# Patient Record
Sex: FEMALE | Race: WHITE
[De-identification: names, ages, dates, MRNs, and addresses within clinical notes are randomized per-mention and may not be internally consistent; named-entity substitution may affect disease eponyms.]

---

## 2018-02-19 NOTE — EDPHY
H & P


Stated Complaint: Dehydrated, fatigued past "week",





- Personal History


LMP (Females 10-55): Over 28 Days Ago


Current Tetanus Diphtheria and Acellular Pertussis (TDAP): Unsure





- Medical/Surgical History


Hx Asthma: No


Hx Chronic Respiratory Disease: No


Hx Diabetes: No


Hx Cardiac Disease: No


Hx Renal Disease: No


Hx Cirrhosis: No


Hx Alcoholism: No


Hx HIV/AIDS: No


Hx Splenectomy or Spleen Trauma: No


Other PMH: lyme disease,





- Social History


Smoking Status: Never smoked


Time Seen by Provider: 02/19/18 02:46


HPI/ROS: 





Chief Complaint:  Dehydrated, fatigue





HPI:  37-year-old woman states that she has been feeling dehydrated and having 

increasing thirst and increasing urination for the past several days.  Patient 

has been diagnosed with chronic Lyme disease.  Patient also states she has been 

diagnosed with heavy metal poisoning and mold exposure in the past.  She has 

been struggling with symptoms for the past 3 years.  She has not seen a primary 

care doctor in about a month and a half.  For the last week or so she has noted 

that she has had increasing urination.  Being coming concerned that she might 

be coming dehydrated because of increasing urination she has been drinking more 

fluids which in turn has caused her to have increased urination.  She does not 

feel particularly thirsty but is concerned about getting dehydrated.  No nausea 

or vomiting.  No abdominal pain.  No fevers or chills.  No recent weight gain 

or weight loss.  She has had increasing sleeping recently.  She denies 

depression or thoughts of suicide.  She states that her workup for her Lyme 

disease was extensive and long.  She was never tested positive for any line 

markers but was diagnosed based on her clinical symptoms by her physician.  She 

states that she has been taking multiple herbal and vitamin supplements to 

treat her symptoms.  No heat or cold intolerance.  No weight loss or weight 

gain recently.





ROS:  10 point Review of Systems is negative except as noted in the HPI.





PMH:  Chronic Lyme disease, heavy metal poisoning,





Social History: No smoking, no alcohol,  no recreational drug use





Family History: non-contributory





Physical Exam:


Gen: Awake, Alert, No Distress


HEENT:  


     Nose: no rhinorrhea


     Eyes: PERRLA, EOMI


     Mouth: Moist mucosa 


Neck: Supple, no JVD


Chest: nontender, lungs clear to auscultation


Heart: S1, S2 normal, no murmur


Abd: Soft, non-tender, no guarding


Back: no CVA tenderness, no midline tenderness 


Ext: no edema, non-tender


Skin: no rash


Neuro: CN II-XII intact, Sensation grossly intact, Strength 5/5 in bilateral 

upper and lower extremities


 (Bobby Rincon)


Constitutional: 


 Initial Vital Signs











Temperature (C)  36.9 C   02/19/18 02:29


 


Heart Rate  86   02/19/18 02:29


 


Respiratory Rate  20   02/19/18 02:29


 


Blood Pressure  122/79 H  02/19/18 02:29


 


O2 Sat (%)  98   02/19/18 02:29








 











O2 Delivery Mode               Room Air














Allergies/Adverse Reactions: 


 





No Known Allergies Allergy (Unverified 02/19/18 02:29)


 








Home Medications: 














 Medication  Instructions  Recorded


 


LORazepam [Ativan (*)] 1 mg PO BID PRN #8 tab 02/19/18














Medical Decision Making





- Diagnostics


Imaging Results: 


 Imaging Impressions





Head CT  02/19/18 09:37


Impression: No evidence for acute intracranial abnormality. Possible enlarged 

sella turcica. Consider MRI brain without and with contrast with attention to 

the pituitary gland.


 


Results called and discussed with Christy Figueroa MD on 2/19/2018 at 11:15 a.m.











ED Course/Re-evaluation: 





37-year-old presenting with fatigue, not acting herself per her family.  

Increased urination and increased oral fluid intake.  Her chemistry is normal 

including a normal glucose, normal sodium, normal renal function.  Her CBC is 

normal.  She is not pregnant.  Has bent over 40 min speaking with her and her 

parents were quite concerned about symptoms.  Patient states that she is 

feeling quite fatigued.  She has not had any falls or syncope.  She is 

concerned that she is dehydrated however she is having polyuria secondary to 

her increased fluid intake.  She is not hyperglycemic are does not have any 

symptoms suggestive diabetes.  She also has a been diagnosed in the last 3 

years with both heavy metal poisoning, mold exposure and Lyme disease.  I 

suspect her symptoms are possibly secondary to underlying depression.  Family 

is adamant that they want the patient admitted to the hospital however I told 

them that I do not feel that admitting to the hospital would be beneficial 

providing any specific treatments or testing.  I do think that the patient 

would benefit from mental health evaluation.  She is awake and alert and 

answering questions.  She has been able to ambulate unassisted was having 

difficulty functioning at home.  I have requested mental health evaluation.





0545  patient has eloped from the department.  She left with her IV in place.  

Police department has been called.





0630  please report patient has been found.  They bring her back here.  I have 

discussed with mom.  Patient's mother states that prior to her leaving she was 

getting quite agitated insisting she that she needed bananas and she needed 

potassium.  She got increasingly agitated per her mom that she was unable to 

get the food that she wanted.  This led to her deciding to leave the 

department.  Based on her initial presentation and subsequent events I believe 

the patient is at this point gravely disabled.  She will be placed on a mental 

health hold once she returns.  Mental health evaluation has been arranged.





0650  patient has been brought back by police.  She is now telling me that she 

does have some mental information she difficult disclose earlier.  Last month 

she had a insulin like growth factor test done by her endocrinologist which was 

elevated indicating the possibility of a pituitary adenoma.  Risk for MRI have 

been denied.  Given this information in her symptomatology is concerning for 

the possibility of a pituitary adenoma.  An MRI has been ordered.  The patient 

has placed a detain her.  If the MRI is negative with mental health evaluation.

  Positive obviously this might be a contributor to her symptoms.  Patient has 

been signed out to Dr. Figueroa pending MRI results.  Meantime the patient has 

been placed on a detain her to ensure that she does not leave until her workup 

is been completed (Bobby Rincon)


Differential Diagnosis: 





Differential diagnoses for the patient's symptom complex was considered 

including but not limited to the adrenal insufficiency, pituitary adenoma, 

dehydration, diabetes, electrolyte abnormality, sleep deprivation, anxiety, 

depression. (Christy Figueroa)


Other Provider: 





I assumed care of this patient from Dr. Jones Rincon at change of shift, 7:00 

a.m..  At this time we are awaiting the results of an MRI.  Patient had 

reported a history that she has been seen by Dr. Porter and was concerned 

regarding a potential pituitary mass.





MRI with and without contrast with evaluation of the pituitary was ordered.  I 

was asked to see the patient in MRI as she has significant concerns regarding 

contrast.  She has no history of contrast to MRI contrast that I can identify 

the patient reports that she would be willing to have the MRI.





Patient returned unexpectedly from MRI after reporting significant 

claustrophobia.





This time I held a long conversation with the patient as well as the patient's 

family.  We discussed the evaluation thus far and I discussed with the family 

that her increased thirst, increased urination, and increased appetite did not 

appear to have a clear medical cause that I could identify in the emergency 

department and that the patient's electrolytes were largely unremarkable.  I 

discussed at length with the patient regarding her insomnia and her erratic 

behavior earlier today.  She does agree to have a mental health evaluation.





Prior to her mental health eval, patient did have a CT scan of the head which 

does not demonstrate a large pituitary mass.





I did reach out to Dr. Porter.  Dr. Porter informs me that she is concerned the 

patient may have acromegaly and asked that I have try to convince the patient 

to undergo MRI here.





I again discussed the situation with the family and the patient.  Patient would 

prefer to have an MRI obtained as an outpatient in the setting of an open MRI.





  She was seen by Prashanth for mental health.  Please see his evaluation.  

Patient was discharged home in the care of her family.  She will be staying 

with her sister in law who is a counselor and her brother.  Patient's primary 

complaint to me at the time of discharge was that she needed to be able to 

sleep and has been suffering from insomnia.  She was advised to use Benadryl 

was also given a short prescription of Ativan. (Christy Figueroa)





- Data Points


Laboratory Results: 


 Laboratory Results





 02/19/18 02:54 





 02/19/18 02:54 





 











  02/19/18 02/19/18 02/19/18





  03:30 02:55 02:43


 


POC Glucose      220 mg/dL H mg/dL





     () 


 


Total Bilirubin  0.1 mg/dL mg/dL    





   (0.1-1.4)   


 


Conjugated Bilirubin  0.1 mg/dL mg/dL    





   (0.0-0.5)   


 


Unconjugated Bilirubin  0.0 mg/dL mg/dL    





   (0.0-1.1)   


 


AST  32 IU/L IU/L    





   (14-46)   


 


ALT  46 IU/L IU/L    





   (9-52)   


 


Alkaline Phosphatase  64 IU/L IU/L    





   ()   


 


Total Protein  8.0 g/dL g/dL    





   (6.3-8.2)   


 


Albumin  4.4 g/dL g/dL    





   (3.5-5.0)   


 


Lipase  122 IU/L IU/L    





   ()   


 


TSH  4.160 uIU/mL uIU/mL    





   (0.465-4.680)   


 


FSH  3.93 mIU/mL mIU/mL    





    


 


Luteinizing Hormone  1.85 mIU/mL mIU/mL    





    


 


Prolactin  13.2 ng/mL ng/mL    





   (3.0-18.6)   


 


Ethyl Alcohol    < 10 mg/dL mg/dL  





    (0-10)  











Point of Care Test Results: 


 











  02/19/18





  02:43


 


POC Glucose  220 H














Departure





- Departure


Disposition: Home, Routine, Self-Care


Clinical Impression: 


 Increased thirst





Fatigue


Qualifiers:


 Fatigue type: unspecified Qualified Code(s): R53.83 - Other fatigue





Insomnia


Qualifiers:


 Insomnia type: other insomnia Qualified Code(s): G47.09 - Other insomnia





Condition: Good


Instructions:  Insomnia (ED), Fatigue (ED)


Additional Instructions: 


Please follow up with Dr. Porter to obtain your MRI.





Please follow up with your primary care physician concerning referrals to a 

prescriber if you feel that you need medications for depression or anxiety.  

You have also been given information regarding outpatient counseling services 

via the hospital.





Please take Benadryl 25-50 mg to help you sleep.





You been provided with a prescription for Ativan 1 mg tablets.  Please use this 

as needed for severe insomnia.





[facility] concerning 

Lyme disease.











Referrals: 


NONE *PRIMARY CARE P,. [Primary Care Provider] - As per Instructions


Barbara Porter MD [place] [place] - As per Instructions


Froylan Marti MD [Medical Doctor] - As per Instructions (Please follow up at 

[facility] regarding a 2nd opinion for Lyme disease.)


Prescriptions: 


LORazepam [Ativan (*)] 1 mg PO BID PRN #8 tab


 PRN Reason: insomnia, anxiety

## 2018-06-14 ENCOUNTER — HOSPITAL ENCOUNTER (INPATIENT)
Dept: HOSPITAL 80 - FED | Age: 38
LOS: 21 days | Discharge: PSYCHIATRIC OR SUBSTANCE USE FACILITY | DRG: 885 | End: 2018-07-05
Attending: NURSE PRACTITIONER | Admitting: NURSE PRACTITIONER
Payer: COMMERCIAL

## 2018-06-14 DIAGNOSIS — E22.0: ICD-10-CM

## 2018-06-14 DIAGNOSIS — F29: Primary | ICD-10-CM

## 2018-06-14 DIAGNOSIS — Z85.820: ICD-10-CM

## 2018-06-14 DIAGNOSIS — D75.89: ICD-10-CM

## 2018-06-14 LAB — PLATELET # BLD: 274 10^3/UL (ref 150–400)

## 2018-06-14 PROCEDURE — G0480 DRUG TEST DEF 1-7 CLASSES: HCPCS

## 2018-06-14 NOTE — ASMTTLCEVL
TLC Evaluation - Basic Information

 

Evaluation Start Date and     2018 03:30 PM

Time                          

Hospital Status               Answers:  M1 Hold                               

72-hr M1 Hold Start Date      2018 05:35 PM

and Time                      

Patient statement             

Notes:

"The court ordered evaluation brought me in."

Narrative                     

Notes:

Pt is a 30 year old female who was brought to [facility] ed on a court ordered evaluation.The petitioner 

was her father El.  Both MOC and FOC were in the emergency department with pt. Per 

parents, they started noticing changes in her behavior in 2018. In 2018, pt was 

brought to [facility] ed for dehydration and as the visit went on, pt became frustrated and left the 

hospital with an IV in her arm. Pt was returned by [facility] and was placed on a M1 hold and evaluated by 


TLC. Per FOC, pt has always been a very health conscious person and recently pt was dx with a 

pituitary tumor and is refusing the doctor's advice of surgery, even though the doctor informed her 


that this decision  would cut ten years off her life expectancy.  [facility] stated this decision was very 


unlike her and seems like an irrational decision In early April, FOC stated he became aware that pt 


started hearing voices.  Pt confided on a car ride to [place] that she was channeling  

relatives that she was working with the  of Defense to disarm nuclear weapons.  FOC 

stated, "Her mother and I also have witnessed her talking in deeper voices and in third person 

stating "Xiao needs to do this."  Most recently, pt believes she is in a relationship with a 

former co-worker, a doctor named Gerry. POC stated Gerry remembers pt from working with her years 

ago, but are not friends and have not been in contact over the years.  Per POC, pt believes they 

are "soul mates."POC stated that pt has been texting him constantly and a couple of weeks ago she 

showed up at a conference Gerry was in. Per POC, Gerry was scared and so was his family and Gerry 

notified the police. A couple of weeks ago, pt drove up to [place] to "meet Gerry for 

dinner." POC stated pt stayed a week waiting to meet him. POC stated pt believes that Gerry has been 

"hypnotized and that's why he isn't with her right now."  Per court ordered evaluation, "pt has 

been making choices that are dangerous, like driving in snowstorms, swimming in freezing cold lakes 


and back country skiing in the mountains at night

Diagnosis History             

Notes:

Pt denies any prior dx.

Prior suicide attempts        

Notes:

Pt denied any prior suicide attempts.  Per TLC eval on 18, "pt denied any past history of 

suicide attempts. pt reported having made a few superficial marks to her left wrist 5 days ago, but 


was not a suicide attempt, per pt. pt stated i was just feeling anxious and couldnt sleep. 

Prior hospitalizations        

Notes:

Pt denied any prior hospitalizations. 

Treatment Responses           

Notes:

N/A

History of violence           

Notes:

Pt denied any hx of violence.

Therapist:                    YURIY WHATLEY

Psychiatrist:                 None

Medications                   

(name, dosage, route, freq    

uency)                        

Notes:

Pt does not take any prescribed medications but reported that she takes a supplement that she 

orders from [employer] called, Artensunate with Bicarbonate.  Pt stated it is an injection she gives 


herself.  When asked why does she take this, pt stated, I take it for anti-allergy stuff, recently 

I got a lot of mosquito bites on my feet so it helps with that."  Per parents, a few months ago, pt 


was taking "50-60 different supplements."

Allergies/Reaction            

Notes:

NKA

Sleep                         

Notes:

Pt stated she sleeps "much better."

Appetite                      

Notes:

WNL

Medical/Surgical history      

Notes:

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 Pituitary Tumor. 

Substance use history         

(frequency, intensity, his    

tory, duration)               

Notes:

Pt stated, " I recently started drinking again which is good. I wasn't drinking for 1.5 

years.  It's nice to relax.  Pt stated she drinks about 5x a week. Pt stated she also uses cannabis 


oil.  When asked how often she uses, pt stated, " I'm not really good at remembering, maybe 4-5 

times a week."  Per FOC, pt never would use drugs or alcohol in the past and this appears to be a 

new behavior. Utox was negative for all substances. Bal was.0.

Family composition            

Notes:

Prents remain . pt has two brothers, ages 40 and 32. parents, boyfriend, and 

sister-in-law, appeared highly concerned and enmeshed/overinvolved in their concerns for pt.

Need for family               Answers:  No                                    

participation in                                                              

patient's care                                                                

Family                        

psychiatric/substance         

abuse history                 

Notes:

.P denied knowledge of any family history of mental health or substance abuse issues.

Developmental history         

Notes:

Pt was born and raised in [place], [place]. she described her childhood as a normal [place] family. she 


appeared to have achieved normal childhood developmental milestones. she denied any childhood 

history of tbis, loc or concussions. she denied any childhood history of physical, emotional or 

sexual abuse/trauma.

Abuse concerns                Answers:  None                                  

Marital status/children       

Notes:

Pt is not  but stated, " I have a soul mate. I was aware of him 8-9 years ago but I think he 


was looking for me longer.  We are meant to be together." Pt stated her "soul mate" is Gerry Quinn 

and is a surgeon.  Per court ordered eval, this is not a real relationship and is part pt's 

delusions. 

Living situation              

Notes:

Pt lives in [place]. 

Sexual                        

history/orientation           

Notes:

Heterosexual.

Peer support/family           

strengths                     

Notes:

Pt reported that her family is supportive. Pt stated she has friends but, "it's been tough felecia I 

haven't felt that well lately."  Pt stated she has lost touch with some of her friends. 

Education level/history       

Notes:

Pt has a MA degree in Biomedical Engineering. 

Work history                  

Notes:

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 she reported being 


on medical leave since 2017.  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 

                      

Notes:

None

Legal                         

Notes:

None

Orthodoxy/Spiritual           

Notes:

None that would intefere with tx.

Leisure                       

Notes:

Pt stated she enjoys being outside, swimming and skiing. 

Collateral                    

Notes:

Parents- Yoly TY Evaluation - Mental Status Exam

 

Appearance:                   Answers:  Appropriate                           

Eye Contact:                  Answers:  Intermittent                          

Mood:                         Answers:  Euthymic                              

Affect:                       Answers:  Calm                                  

                                        Guarded                               

Behavior:                     Answers:  Cooperative                           

                                        Guarded                               

                                        Resistive to Care                     

                                        Restless                              

Speech:                       Answers:  Clear                                 

                                        Mumbling                              

                                        Perseverating                         

Thought Process:              Answers:  Distracted                            

Insight:                      Answers:  Poor                                  

Judgement:                    Answers:  Poor                                  

Hallucinations:               Answers:  Auditory                              

Delusions:                    Answers:  Erotic/Stalking                       

                                        Paranoid Ideation                     

Pt reported to have           Answers:  No                                    

suicidal/self-injuring                                                        

ideation/behavior?                                                            

Pt reported to be making      Answers:  No                                    

suicidal/self-injuring                                                        

threats?                                                                      

Pt reported to have           Answers:  No                                    

aggression/assault                                                            

ideation/behavior?                                                            

Pt reported to be making      Answers:  No                                    

aggression/assault                                                            

threats?                                                                      

Pt exhibits inability to      Answers:  Yes                                   

care for self/grave                                                           

disability?                                                                   

Ideation/behavior is          Answers:  Yes                                   

chronic?                                                                      

Patient has a specific        Answers:  No                                    

plan?                                                                         

History of                    Answers:  Yes                                   

suicidal/self-injuring                                                        

ideation, behavior, or                                                        

threats?                                                                      

History of                    Answers:  No                                    

aggressive/assaultive                                                         

ideation, behavior, or                                                        

threats?                                                                      

History of serious            Answers:  No                                    

physical harm to                                                              

self/others while in                                                          

treatment setting?                                                            

TLC Evaluation - Suicide/Homicide Risk

 

Suicide Risk Factors:         Answers:  < 20 or > 40 Years of Age             

                                        Command Hallucinations                

                                        Psychotic Disorder                    

                                        Self-Harm Behaviors                   

Homicide/violence risk        Answers:  None                                  

factors:                                                                      

Current Suicidal              Answers:  No                                    

Ideation?                                                                     

Current Suicidal Ideation     Answers:  No                                    

in the Past 48 Hours?                                                         

Current Suicidal Ideation     Answers:  No                                    

in the Past Month?                                                            

Current Suicidal              Answers:  No                                    

Ideation, Worst Ever?                                                         

Suicide Internal              Answers:  Other                         Notes:  Spirtual beliefs and 

Protective Factors:                                                           stated, " I want to foc
us 

                                                                              on how I can get myself
 

                                                                              healthier."

Ranking of patient's          Answers:  Moderate                              

suicidal risk:                                                                

Ranking of patient's          Answers:  Low                                   

homicidal risk:                                                               

TLC Evaluation - Wrap-up

 

AXIS I Diagnosis (include     

DSM-V and ICD-10              

codes), must also be          

entered in                    

[employer], which is the        

source of truth.              

Notes:

DELUSIONAL DISORDER (Specifier)   297.1 (F22)

Evaluation End Date and       2018 07:25 PM

Time (HH:TATYANA):                 

 

Date Signed:  2018 07:31 PM

Electronically Signed By:Pam Hernandez

## 2018-06-14 NOTE — EDPHY
General





- History


Smoking Status: Never smoked


Time Seen by Provider: 18 13:54


Narrative: 





CHIEF COMPLAINT: 


Court ordered mental health evaluation





HISTORY OF PRESENT ILLNESS: 


Patient presents by [place] [employer] for court-ordered mental health 

evaluation.  She is accompanied by her father.  Father reports she has had 

"risky behavior, hearing things, talking to people who are ." He 

expresses a more global concerned due to patient's "not taking care of herself 

or seeking medical treatment for a pituary tumor.  Patient denies suicidal 

ideation or lacerations.  She states that "I have been channeling people." And 

she does describe possible auditory hallucinations.  She does not describe any 

visual hallucinations.  She says that she has been trying to treat a chronic 

Lyme disease and other "inflammatory diseases." Her father was concern for 

behavior and thus instigated medical proceedings, ultimately resulting in a 

court-appointed mental evaluation today.  No other associated complaints or 

modifying factors





PSYCHIATRIC DIAGNOSES:


None





PRIOR PSYCHIATRIC EVALUATIONS:


None





M1/DETAINER:


Court ordered mental health evaluation





SOCIAL HISTORY:


Never smoker.  Lives and works here independently as a .





REVIEW OF SYSTEMS:


Ten systems reviewed and are negative unless otherwise noted in the HPI





EXAMINATION


General Appearance:  Alert, no distress.  Well developed and well nourished.


Head: normocephalic, atraumatic


Eyes:  Pupils equal and round, no conjunctival pallor or injection


ENT, Mouth:  Mucous membranes moist


Neck:  Normal inspection, supple, non-tender


Respiratory:  Lungs are clear to auscultation. 


Cardiovascular:  Regular rate and rhythm. No murmur


Gastrointestinal:  Abdomen is soft and nontender


Back: non-tender, no bony abnormalities


Neurological:  GCS 15. A&O, nonfocal, normal gait


Skin:  Warm and dry, no rash


Extremities:  Nontender, no pedal edema


Psychiatric:  Mood and affect normal.  She describes auditory hallucinations.  

She denies HI or SI.








DIFFERENTIAL DIAGNOSES:


Including but not limited to acute psychosis, schizophrenia, schizoaffective








MDM:


2:20 p.m.


Court ordered mental health evaluation for possible grave disability.  Patient 

denies any suicidal ideation.  At this time she is cooperative and is on a 

detained her.  Mental health professional is assisting with the court ordered 

evaluation.  There is mention of a pituitary mass/tumor, but in reviewing the 

patient's documentation this is only an abnormal enlargement of the sella 

turcica on a CT scan.  I will discuss with Dr. Varela.





3:00 p.m.


Chart review discovers that there was an MRI performed at [facility] that did reveal a pituitary tumor.  This is being monitored by 

endocrinologist at [facility].





3:30 p.m.


Notified by RN that the patient has been taking Artesunate for her "chronic 

lyme and inflammatory diseases." I have researched this on Up To Date, and I do 

not identify any acute for this.





4:05 p.m.


Patient currently be evaluated by EPS.





5:40 p.m.


At this time, Dr. Varela will assume care of the patient. Please see his note 

for further care and disposition. She is pending mental health evaluation and 

placement.








SUPERVISION: 


Patient was independently examined, but I discussed the case with my secondary 

supervising physician Dr. Varela ([facility])


Medical Decision Making: 





Updated 6:00 p.m.:  The patient has been accepted for inpatient psychiatric 

[facility].  I have filled out the EMTALA transfer form. (Dewayne Varela)





- Objective


Vital Signs: 





 Initial Vital Signs











Temperature (C)  36.8 C   18 13:57


 


Heart Rate  97   18 13:57


 


Respiratory Rate  16   18 13:57


 


Blood Pressure  148/86 H  18 13:57


 


O2 Sat (%)  96   18 13:57








 











O2 Delivery Mode               Room Air














Allergies/Adverse Reactions: 


 





No Known Allergies Allergy (Unverified 18 02:29)


 








Home Medications: 














 Medication  Instructions  Recorded


 


Artesunate  18


 


Cilantro  18











Laboratory Results: 





 Laboratory Results





 18 14:30 





 18 14:30 





 











  18





  14:30 14:30 14:30


 


WBC      





    


 


RBC      





    


 


Hgb      





    


 


Hct      





    


 


MCV      





    


 


MCH      





    


 


MCHC      





    


 


RDW      





    


 


Plt Count      





    


 


MPV      





    


 


Neut % (Auto)      





    


 


Lymph % (Auto)      





    


 


Mono % (Auto)      





    


 


Eos % (Auto)      





    


 


Baso % (Auto)      





    


 


Nucleat RBC Rel Count      





    


 


Absolute Neuts (auto)      





    


 


Absolute Lymphs (auto)      





    


 


Absolute Monos (auto)      





    


 


Absolute Eos (auto)      





    


 


Absolute Basos (auto)      





    


 


Absolute Nucleated RBC      





    


 


Immature Gran %      





    


 


Immature Gran #      





    


 


Sodium      143 mEq/L mEq/L





     (135-145) 


 


Potassium      4.2 mEq/L mEq/L





     (3.3-5.0) 


 


Chloride      105 mEq/L mEq/L





     () 


 


Carbon Dioxide      22 mEq/l mEq/l





     (22-31) 


 


Anion Gap      16 mEq/L mEq/L





     (8-16) 


 


BUN      18 mg/dL mg/dL





     (7-23) 


 


Creatinine      0.7 mg/dL mg/dL





     (0.6-1.0) 


 


Estimated GFR      > 60 





    


 


Glucose      110 mg/dL H mg/dL





     () 


 


Calcium      9.0 mg/dL mg/dL





     (8.5-10.4) 


 


TSH      1.660 uIU/mL uIU/mL





     (0.465-4.680) 


 


Beta HCG, Qual    NEGATIVE   





    


 


Urine Opiates Screen  NEGATIVE     





   (NEGATIVE)   


 


Urine Barbiturates  NEGATIVE     





   (NEGATIVE)   


 


Ur Phencyclidine Scrn  NEGATIVE     





   (NEGATIVE)   


 


Ur Amphetamine Screen  NEGATIVE     





   (NEGATIVE)   


 


U Benzodiazepines Scrn  NEGATIVE     





   (NEGATIVE)   


 


Urine Cocaine Screen  NEGATIVE     





   (NEGATIVE)   


 


U Marijuana (THC) Screen  NEGATIVE     





   (NEGATIVE)   


 


Ethyl Alcohol      < 10 mg/dL mg/dL





     (0-10) 














  18





  14:30


 


WBC  10.56 10^3/uL H 10^3/uL





   (3.80-9.50) 


 


RBC  3.89 10^6/uL L 10^6/uL





   (4.18-5.33) 


 


Hgb  13.3 g/dL g/dL





   (12.6-16.3) 


 


Hct  39.6 % %





   (38.0-47.0) 


 


MCV  101.8 fL H fL





   (81.5-99.8) 


 


MCH  34.2 pg H pg





   (27.9-34.1) 


 


MCHC  33.6 g/dL g/dL





   (32.4-36.7) 


 


RDW  13.5 % %





   (11.5-15.2) 


 


Plt Count  274 10^3/uL 10^3/uL





   (150-400) 


 


MPV  9.4 fL fL





   (8.7-11.7) 


 


Neut % (Auto)  63.5 % %





   (39.3-74.2) 


 


Lymph % (Auto)  16.1 % %





   (15.0-45.0) 


 


Mono % (Auto)  11.0 % %





   (4.5-13.0) 


 


Eos % (Auto)  8.5 % H %





   (0.6-7.6) 


 


Baso % (Auto)  0.3 % %





   (0.3-1.7) 


 


Nucleat RBC Rel Count  0.0 % %





   (0.0-0.2) 


 


Absolute Neuts (auto)  6.71 10^3/uL H 10^3/uL





   (1.70-6.50) 


 


Absolute Lymphs (auto)  1.70 10^3/uL 10^3/uL





   (1.00-3.00) 


 


Absolute Monos (auto)  1.16 10^3/uL H 10^3/uL





   (0.30-0.80) 


 


Absolute Eos (auto)  0.90 10^3/uL H 10^3/uL





   (0.03-0.40) 


 


Absolute Basos (auto)  0.03 10^3/uL 10^3/uL





   (0.02-0.10) 


 


Absolute Nucleated RBC  0.00 10^3/uL 10^3/uL





   (0-0.01) 


 


Immature Gran %  0.6 % %





   (0.0-1.1) 


 


Immature Gran #  0.06 10^3/uL 10^3/uL





   (0.00-0.10) 


 


Sodium  





  


 


Potassium  





  


 


Chloride  





  


 


Carbon Dioxide  





  


 


Anion Gap  





  


 


BUN  





  


 


Creatinine  





  


 


Estimated GFR  





  


 


Glucose  





  


 


Calcium  





  


 


TSH  





  


 


Beta HCG, Qual  





  


 


Urine Opiates Screen  





  


 


Urine Barbiturates  





  


 


Ur Phencyclidine Scrn  





  


 


Ur Amphetamine Screen  





  


 


U Benzodiazepines Scrn  





  


 


Urine Cocaine Screen  





  


 


U Marijuana (THC) Screen  





  


 


Ethyl Alcohol  





  














Departure





- Departure


Disposition: [facility] IP


Clinical Impression: 


 Psychosis





Condition: Fair


Referrals: 


NONE *PRIMARY CARE P,. [Primary Care Provider] - As per Instructions

## 2018-06-14 NOTE — ASMTTCLDSP
TLC Discharge Disposition

 

Disposition:                  Answers:  Admit                                 

Discharge                     

Concerns/Recommendations:     

Notes:

IN CONSULTATION WITH [facility] ED PHYSICIAN, KRISTOFER GARZA MD AND ON-CALL PSYCHIATRIST, QUINCY MARIE MD, BOTH CONCURRED THAT PT APPEARS TO MEET 27-65 CRITERIA REQUIRING PSYCHIATRIC 

HOSPITALIZATION AS PT APPEARS TO BE GRAVELY DISABLED DUE TO A MENTAL ILLNESS CONDITION. PT WAS 

GIVEN THE [facility] PROHIBITED BELONGINGS LIST WHILE IN THE ED.

Was patient given the         Answers:  Yes                                   

Inpatient Behavioral                                                          

Health Prohibited                                                             

Belongings List while in                                                      

the ED?                                                                       

For inpatient                 Quincy Marie MD

admission, the following      

psychiatrist agreed to        

accept patient for            

admission to Behavioral       

Health ([facility]):              

Type of Hold:                 Answers:  M1/72-hour Hold                       

Hold initiated by:            Answers:  ED Physician                          

 

Date Signed:  06/14/2018 07:35 PM

Electronically Signed By:Pam Hernandez
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
## 2018-06-15 NOTE — BCON
[f 
rep st]



                                                  BEHAVIORAL HEALTH CONSULTATION





INTERNAL MEDICINE CONSULTATION



DATE OF CONSULTATION:  06/15/2018



REFERRING PHYSICIAN:  Halima Marie MD



REASON FOR REFERRAL:  Medical clearance for inpatient behavioral health stay.



HISTORY OF PRESENT ILLNESS:  This person came to the emergency department, 
brought in by [employer] for court-ordered mental health evaluation.  Her 
father was reporting that she had risky behavior, hearing things and talking to 
people who are .  He was concerned that she was not taking care of 
herself.  She is currently without any acute medical complaints.



PAST MEDICAL HISTORY:  

1.  Chronic macrocytosis.

2.  Chronic Lyme disease.



PAST SURGICAL HISTORY:  She denies any surgeries.



MEDICATIONS:  She takes a variety of herbal supplements on an intermittent basis
, and she also has a prescription for artesunate, which is not FDA approved in 
the [place] and is a medication for malaria.



ALLERGIES:  There are no known drug allergies.



SOCIAL HISTORY:  She reports that she lives alone.  Her boyfriend recently 
moved out.  She is a nonsmoker.  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



FAMILY HISTORY:  Noncontributory.



REVIEW OF SYSTEMS:  She reports that she snores occasionally, but overall 
sleeps well.  She has had a recent weight loss, which she reports is intentional
, and the chart documents approximately a 7 kg weight loss since February.  She 
is not in pain.  She denies cough or dyspnea.  She denies fevers or chills.  
She denies nausea, vomiting, constipation, or diarrhea.  Otherwise, a 10-point 
review of systems is negative.



PHYSICAL EXAM:  VITAL SIGNS:  Blood pressure is 122/67.  This was at midnight, 
approximately.  Heart rate was 98.  Respiratory rate was 14. Oxygen saturation 
was 95% on room air.  Temperature was 36.6 degrees centigrade.  GENERAL:  This 
is a well-nourished, well-developed woman, appears her chronologic age, 
cooperative, and in no acute distress.  HEENT:  Extraocular movements are 
intact.  Pupils are equal, round, and reactive to light.  Mucous membranes are 
moist.  She has a moderately crowded airway, Mallampati class 3.  NECK:  
Supple.  HEART:  Regular rate and rhythm, with no murmurs, rubs, or gallops.  
LUNGS:  Clear to auscultation bilaterally.  ABDOMEN:  Benign.  EXTREMITIES:  
There is no cyanosis, clubbing, or edema.  NEUROLOGIC:  Orientation was not 
checked.  Cranial nerves 2-12 are grossly intact.  There is no focal weakness, 
and sensation is intact to light touch.



LABORATORY DATA:  Laboratory studies drawn in the emergency department:  CBC 
showed an elevated white blood cell count at 10.56.  She has macrocytosis with 
an MCV of 101.8.  Mean cellular hemoglobin is also elevated at 34.2, and there 
is no left shift in the white blood cells.  Serum chemistry shows normal renal 
function and electrolytes.  TSH was normal at 1.66.  Glucose was slightly 
elevated at 110, but this is likely not fasting.  Beta hCG was negative for 
pregnancy.  Toxicology screen in the serum was negative for ethyl alcohol, and 
the urine is negative for any substances of abuse.



ASSESSMENT AND RECOMMENDATIONS:  

1.  Question of mental health condition to be evaluated per Psychiatry and the 
mental health team.

2.  Weight loss, which she reports as intentional.  She has a normal TSH.  
Advised observing for normal oral intake.

3.  Macrocytosis.  She reports this is chronic, but it was not present on lab 
testing on 2018.  I do not see testing for vitamin B12, and I will add 
this on to her labs.

4.  Pituitary tumor is reported in the emergency department note, and emergency 
department documents an MRI of the brain done at the [facility], which showed 
an enlarged sella turcica.  Over the last several months, she has had a very 
thorough endocrinology laboratory workup, including TSH, free T4, ovarian 
hormones, and prolactin, all of which have been within normal limits.  She does 
not appear to show any neurologic signs consistent with an expanding mass.  She 
can have continued evaluation on an outpatient basis.

5.  Question of chronic Lyme disease.  I do not believe that there is strong 
evidence in the literature of chronic Lyme disease either causing psychiatric 
or pain syndromes.  It is possible that this is part of the delusional system 
on her part.  It might be worthwhile to obtain outside records to follow up on 
this.  She does not appear to be showing any current signs or symptoms of 
active infection, other than the elevated white blood cell count, but that 
could be due to the stress of being brought to the hospital against her will.



I see no medical contraindications to this patient's continued stay on the 
inpatient behavioral health unit or to any psychiatric medications or 
procedures.

  

Thank you very much for including me in the care of this patient, and please do 
not hesitate to contact me or the hospitalist service should there be need for 
further medical evaluation.





Job #:  [id number]

MTDD

## 2018-06-15 NOTE — ASMTBHMTP
Master Treatment Plan

 

Master Treatment Plan         Answers:  Impaired Reality                      

for:                                                                          

Date:                         06/14/2018

Diagnosis on Admission:       Delusional Disorder

Expected length of stay:      3-5 days

Reason for admission:         

Notes:

Patient is a 30 yoa female, who was brought to ED for a court ordered evaluation The petitioner was 


her father El.*  Client denies any S/I or H/I symptoms. Pt is not on any medications at this 

time; however, does take "supplements" that she orders from [facility] (unknown).  Stated in [facility] 

report that "[facility] [place] [facility] has witnessed her talking in deep voices  and in the 'third person,' stating 

Yang needs to do this." 

Patient's stated              

presenting problems:          

Notes:

Parents want me "to do western medication protocol." 

Patient's goals for           

treatment:                    

Notes:

For Doctor German to work to get me out of here.

Patient's strengths:          

Notes:

I listen well, I stay positive, focus and I like to learn new things.

Identify supports outside     

of hospital:                  

Notes:

"I have a lot of friends." 

Discharge criteria:           

Notes:

Psychotic Symptoms will be reduced or eliminated with return to baseline functioning in 

affect, thinking and behavior prior to discharge.**

Initial disposition           

plan/considerations:          

Notes:

Continue to get better and return to my house.**

Master Treatment Plan Required Signatures

 

Psychiatrist signature:       Answers:  ILEANA Sebastian: __________________________

RN on-shift signature:        Answers:  RN: ____________________________________

Patient signature:            Answers:  Patient: ____________________________________

 

Date Signed:  06/15/2018 12:51 PM

Electronically Signed By:Kj Bone

## 2018-06-16 NOTE — ASMTBHDC
Notes

 

Note:                         

Notes:

Patient's parents visited today at the lunch hour.  Patient woudl not sign a Release of Informaiton 


to speak with her parents, but she was fine with them being in the room when we met.  Patient 

continues to have delusions and has been call her family, ranting and raging as well as talking in 

her intrusive voices.  Patient has a restraining order against a doctor she has been stalking in 

[place].  Patient has been calling the police and is currently on a phone restriction.  Patients 

medical records are in her chart and she does have a small brain tumor.  Patient is taking notes 

and correcting her parents about facts.  Patient continues to be delusional.  Patient did sign a 

Release of information for her doctors.  Client has a therapist, psychologist Ace Draper.  Patient 


call the police to tell them that she was being forced to take [employer].  Patient tried 

to [place] from the unit this morning.  Patient also refused her blood draw this morning.  Patient 

slept 5.5 hours last night.  Her hold is up 06/17/2018 at 17:35.  Patient is aware of this and has 

been informed that she will be placed on a Short Term Certification.

 

Date Signed:  06/16/2018 04:11 PM

Electronically Signed By:Demi Jimenez

## 2018-06-16 NOTE — SOAPPROG
SOAP Progress Note


Assessment/Plan: 


Assessment:








37 yo woman with approx 6 mos h/o psychotic delusions and bizarre behaviors. 

She was dx in 4/2018 with acromegaly secondary to pituitary adenoma. She was dx 

by Dr. Betzaida Bowling, Director of Pituitary Adrenal and Neuroendocrine Tumor 

program at [facility]. Dr. Suh recommended transphenoidal microsurgery 

followed by medication tx with a somatostatin or GH receptor antagonist, with 

radiation as 3rd line tx option. Dr. Suh wrote in letter from 6/7/18, that 

"as an , I would have expected her to have read on line about the tumor 

and signs and symptoms. Instead, Xiao was delusional focusing on purifying 

her body with an antimold cleansing and taking large amounts of alternative 

supplements and vitamins." Dr. Suh concluded that patient's "paranoia and 

delusions are not a result of the pituitary tumor." 





Patient's parents, boyfriend and sister wrote letters to court requesting 

involuntary commitment for a psychiatric evaluation. Patient was transferred 

from [facility] ED to . 








Plan:





06/16/18 17:07


1. MD reviewed medical records provided by [facility] from her evaluation by Dr. Betzaida Bowling at [facility] Endocr[facility] clinic on 4/12/18. 

Labs and MRI of pituitary indicated acromegaly secondary to excess GH d/t 

pituitary adenoma. MD also reviewed letter submitted by family members and 

boyfriend to [facility] petitioning emergency psych evaluation. 

Family reports 2-3 year h/o somatic complaints and hypochondria, as well as 

delusions, paranoia and hallucinations. Patient told MD that she has been on a 

"several year medical journey" to determine what she thinks is wrong with her 

body. MOC and patient report initial sxs were fatigue and "foggy brain." 

Patient believed she had "sick building syndrome" and toxic mold. She had her 

home remodeled and moved in with her parents. She also reported thinking she 

had chronic yeast infections and went on several body cleanses to rid herself 

of all kinds of toxins. She started seeing multiple specialists including CAM 

providers, "energy healers," and purchasing large quantities of alternative 

supplements and herbs online. She reports buying artesunate from website in [place] b

/c she thought she had "protozoa parasites." Over the past 6 mos, family 

reports worsening psychotic sxs. Patient experienced severe sleep disruption, 

AH of her relatives talking to her, believing she was "channeling" her relatives

, and inability to perform tasks at work. Some of these sxs may be related to 

excess GH, including sleep disruption, snoring, arthralgias. There are very few 

cases reported of patients with co-occurring psychotic sxs and acromegaly. Most 

cases involve patients with prior hx of schizophrenia who later develop 

acromegaly (>12 yrs after onset of psychotic disorder). 





Here are 2 references MD found of individual case reports: 


-Bella LEOS et al. "Cases where pituitary tumor is presented first with 

psychiatric signs are very rare."  Psychiatry, Vol 41, Supp, Apr 2017, 

S483. 


18yo man presented with acute psychotic episode (paranoid delusions, 

disorganization, erratic bxs). MRI revealed pituitary macroadenoma. He was dx 

with acromegaly. According to the paper, "his symptoms responded to combination 

of olanzapine and valproic acid, followed by trans-sphenoid resection of the 

adenoma." 





-Aman SPARKS, Pratik ORTIZ, and Jair BARAHONA. "Psychotic symptoms in acromegaly." 

 J Psychiatry. 2005: Jan-Mar, 47(1): 58-59. 


44 yo man with persecutory delusions and IOR with auditory hallucinations of 6 

months duration. Labs revealed elevated GH. CT scan revealed pituitary tumor. 

Patient was started on risperidone 2mg and dosage was increased to 8mg. The 

psychotic sxs remitted in one month. The paper mentions, "no specific treatment 

for acromegaly was given as the patient was non-compliant." The patient was 

followed for 6 mos, and there was "no relapse of the psychotic sxs or worsening 

of the symptoms of acromegaly." 





2. DA acts as a neurohormone by regulating levels of GH. It does this by 

binding to D2 receptors on pituitary gland and suppressing GH secretion. 

Dopamine-receptor agonists, such as bromocriptine, are used as adjuvant 

treatments for GH excess (though they are minimally effective). This MD could 

not find any information about the effects of using SGAs, which are D2 

antagonists, on GH secreting pituitary adenomas. In the 2 case reports cited 

above, both patients were treated with SGAs, olanzapine and risperidone, with 

beneficial results. MD recommends consulting with Dr. Bowling and Dr. Porter 

about the potential risks and benefits of using antipsychotic medications for 

this patient. 





3. Currently, patient is refusing any "Western medicine" to treat her 

psychosis. She does not believe she has a psychotic disorder. She is willing to 

consider "energy" healing, alternative treatments and supplements and herbal 

remedies, but no psychotropic meds. 





4. Given that the morbidity and mortality from acromegaly is so serious (

potentially 10-15 year shorter lifespan), MD advises court order for 

involuntary medications if patient continues to refuse psychiatric meds and 

refuses to consider any of Dr. Bowling's recommended treatments for her 

pituitary adenoma. 


Subjective: 


Met with patient and her parents, answered their questions, listened to their 

"medical journey" over past 2-3 years, reviewed collateral med records with them

, reviewed chart and d/w staff. MD and CC met with patient and her family for > 

45 min. Patient had numerous questions about the court order for emergency 

psych evaluation. MD answered her questions, and explained that her 

endocrinologist and her psychiatric team were concerned that she has a 

psychotic disorder that interferes with her ability to think logically and make 

good decisions about her medical care. [facility] reminded patient that Dr. Bowling 

explained patient may have a "neurotransmitter disorder." Patient did not 

believe that she had impaired reality testing or that she was unable to think 

logically about her condition. She insisted that her brain was functioning 

"fine." She did not want to take any psychotropic meds. She denied any thoughts

, plans or intent to hurt herself or anyone else. 





Objective: 





 Vital Signs











Temp Pulse Resp BP Pulse Ox


 


 36.4 C   100   16   116/70   96 


 


 06/16/18 06:00  06/16/18 07:00  06/16/18 06:00  06/16/18 07:00  06/16/18 06:00








MSE: Affect: Euthymic  Mood: "Good"  TP: Circumstantial, illogical   TC: Denies 

any SI/HI, denies hallucinations, but previously told POC she was "channeling" 

relative's voices and heard them talking to her, she also has significant 

delusions about her body and health  Insight/Judgment: Impaired





- Time Spent With Patient


Time Spent With Patient: 


45"








- Pending Discharge


Pending Discharge Within 24 Hours: No


Pending Discharge Within 48 Hours: No





ICD10 Worksheet


Patient Problems: 


 Problems











Problem Status Onset


 


Psychosis Acute

## 2018-06-17 NOTE — ASMTBHDC
Notes

 

Note:                         

Notes:

Patient is up on the milieu going to groups and socializing with others.  Patient's hold is up 

tomorrow.  She slept 4 hours last night.  Patient is restricted on the phone, staff needs to dial 

for her and she must stay near the nursing station and patient still called the police.  Police 

called the unit and said that patient wants an update on the restraing order.  Patient's parents 

came to visit her at the lunch hour and they felt she was better today.  

 

Date Signed:  06/17/2018 02:25 PM

Electronically Signed By:Demi Jimenez

## 2018-06-17 NOTE — SOAPPROG
SOAP Progress Note


Assessment/Plan: 


Assessment:








39 yo woman with approx 6 mos h/o psychotic delusions and bizarre behaviors. 

She was dx in 4/2018 with acromegaly secondary to pituitary adenoma. She was dx 

by Dr. Betzaida Bowling, Director of Pituitary Adrenal and Neuroendocrine Tumor 

program at [facility]. Dr. Suh recommended transphenoidal microsurgery 

followed by medication tx with a somatostatin or GH receptor antagonist, with 

radiation as 3rd line tx option. Dr. Suh wrote in letter from 6/7/18, that 

"as an , I would have expected her to have read on line about the tumor 

and signs and symptoms. Instead, Xiao was delusional focusing on purifying 

her body with an antimold cleansing and taking large amounts of alternative 

supplements and vitamins." Dr. Suh concluded that patient's "paranoia and 

delusions are not a result of the pituitary tumor." 





Patient's parents, boyfriend and sister wrote letters to court requesting 

involuntary commitment for a psychiatric evaluation. Patient was transferred 

from [facility] ED to . 








Plan:





06/16/18 17:07


1. MD reviewed medical records provided by [facility] from her evaluation by Dr. Betzaida Bowling at [facility] Diabetes and Endocrinology clinic on 4/12/18. 

Labs and MRI of pituitary indicated acromegaly secondary to excess GH d/t 

pituitary adenoma. MD also reviewed letter submitted by family members and 

boyfriend to [facility] petitioning emergency psych evaluation. 

Family reports 2-3 year h/o somatic complaints and hypochondria, as well as 

delusions, paranoia and hallucinations. Patient told MD that she has been on a 

"several year medical journey" to determine what she thinks is wrong with her 

body. MOC and patient report initial sxs were fatigue and "foggy brain." 

Patient believed she had "sick building syndrome" and toxic mold. She had her 

home remodeled and moved in with her parents. She also reported thinking she 

had chronic yeast infections and went on several body cleanses to rid herself 

of all kinds of toxins. She started seeing multiple specialists including CAM 

providers, "energy healers," and purchasing large quantities of alternative 

supplements and herbs online. She reports buying artesunate from website in [place] b

/c she thought she had "protozoa parasites." Over the past 6 mos, family 

reports worsening psychotic sxs. Patient experienced severe sleep disruption, 

AH of her relatives talking to her, believing she was "channeling" her relatives

, and inability to perform tasks at work. Some of these sxs may be related to 

excess GH, including sleep disruption, snoring, arthralgias. There are very few 

cases reported of patients with co-occurring psychotic sxs and acromegaly. Most 

cases involve patients with prior hx of schizophrenia who later develop 

acromegaly (>12 yrs after onset of psychotic disorder). 





Here are 2 references MD found of individual case reports: 


-Bella LEOS et al. "Cases where pituitary tumor is presented first with 

psychiatric signs are very rare."  Psychiatry, Vol 41, Supp, Apr 2017, 

S483. 


18yo man presented with acute psychotic episode (paranoid delusions, 

disorganization, erratic bxs). MRI revealed pituitary macroadenoma. He was dx 

with acromegaly. According to the paper, "his symptoms responded to combination 

of olanzapine and valproic acid, followed by trans-sphenoid resection of the 

adenoma." 





-Aman SPARKS, Pratik ORTIZ, and Jair BARAHONA. "Psychotic symptoms in acromegaly." 

 J Psychiatry. 2005: Jan-Mar, 47(1): 58-59. 


44 yo man with persecutory delusions and IOR with auditory hallucinations of 6 

months duration. Labs revealed elevated GH. CT scan revealed pituitary tumor. 

Patient was started on risperidone 2mg and dosage was increased to 8mg. The 

psychotic sxs remitted in one month. The paper mentions, "no specific treatment 

for acromegaly was given as the patient was non-compliant." The patient was 

followed for 6 mos, and there was "no relapse of the psychotic sxs or worsening 

of the symptoms of acromegaly." 





2. DA acts as a neurohormone by regulating levels of GH. It does this by 

binding to D2 receptors on pituitary gland and suppressing GH secretion. 

Dopamine-receptor agonists, such as bromocriptine, are used as adjuvant 

treatments for GH excess (though they are minimally effective). This MD could 

not find any information about the effects of using SGAs, which are D2 

antagonists, on GH secreting pituitary adenomas. In the 2 case reports cited 

above, both patients were treated with SGAs, olanzapine and risperidone, with 

beneficial results. MD recommends consulting with Dr. Bowling and Dr. Porter 

about the potential risks and benefits of using antipsychotic medications for 

this patient. 





3. Currently, patient is refusing any "Western medicine" to treat her 

psychosis. She does not believe she has a psychotic disorder. She is willing to 

consider "energy" healing, alternative treatments and supplements and herbal 

remedies, but no psychotropic meds. 





4. Given that the morbidity and mortality from acromegaly is so serious (

potentially 10-15 year shorter lifespan), MD advises court order for 

involuntary medications if patient continues to refuse psychiatric meds and 

refuses to consider any of Dr. Bowling's recommended treatments for her 

pituitary adenoma. 








06/17/18 15:15


1. Patient continues to decline all psychotropic meds. She is only willing to 

take melatonin. 


2. MD provided parents and patient with list of possible antipsychotic meds to 

treat patient's paranoia and delusions. Patient wants the treatment team to 

discuss any potential treatments with her outpatient endocrinologists, Dr. Bowling and Dr. Porter. MD explained that the team will not be able to attempt 

to reach Francisco Javier Bowling and German until their offices open on Monday, but 

assured patient and family that the team will reach out to them ASAP and get 

their input into any possible treatment recommendations, including what meds 

are indicated with acromegaly. Patient doesn't believe she has a psychotic 

disorder, and doesn't think meds are "necessary." Parents tried to explain the 

medicine would be to help with the "neurotransmitter problem" that Dr. Bowling 

told patient she had. 


3. MD explained patient will be placed on STC and will have to stay in hospital 

involuntarily.





Subjective: 


Met with patient, reviewed chart and d/w staff. Patient has bright affect, 

pleasant and cooperative. She still declines any psychotropic meds and doesn't 

think she needs med b/c she doesn't believe she has psychosis. She asked for 

details about what psychotic sxs she has. MD explained that her family, her 

endocrinologists and her psychiatric team in hospital are concerned about her 

thought process, how she is interpreting information and situations, and her 

ability to reason and reach decisions about treatment. She did not believe that 

Dr. Bowling was concerned b/c patient did not follow through and schedule an 

appointment with neurosurgeon, Dr. Biggs, to discuss surgery to remove her 

pituitary adenoma. Parents had to confirm what MD was saying, and explain that 

family is also concerned that patient isn't making good treatment decisions. MD 

agreed that psych provider tomorrow will reach out to Dr. Bowling and discuss 

patient's condition. However, MD explained that she will need medication to 

treat her mental illness, it is not something that will go away with supplements

, herbal remedies and energy healing patient has been using. Patient does not 

believe this is true. She continues to says she is "willing to consider options

" but only means options that "aren't Western medicine." 





Objective: 





 Vital Signs











Temp Pulse Resp BP Pulse Ox


 


 36.9 C   111 H  16   127/80 H  96 


 


 06/17/18 06:00  06/17/18 06:00  06/17/18 06:00  06/17/18 06:00  06/17/18 06:00








MSE: Affect: Elevated  Mood: "Good" TP: Disorganized, illogical  TC: Denies any 

SI/HI, no hallucinations, still has paranoia and delusions  Insight/Judgment: 

Impaired





- Time Spent With Patient


Time Spent With Patient: 


20"








- Pending Discharge


Pending Discharge Within 24 Hours: No


Pending Discharge Within 48 Hours: No





ICD10 Worksheet


Patient Problems: 


 Problems











Problem Status Onset


 


Psychosis Acute

## 2018-06-18 RX ADMIN — MAGNESIUM HYDROXIDE PRN ML: 400 SUSPENSION ORAL at 10:47

## 2018-06-18 NOTE — ASMTBHDC
Notes

 

Note:                         

Notes:

The patient requested that her outpatient providers be included in her inpatient care. She 

specifically mentioned, Dr. Porter, [facility] Endocrinologist ([phone]), Ace Draper, Therapist 

([phone]),  Dr. Bowling, [facility] Endocrinologist ([phone]). CC left VMs for both 

Dr. Porter and Dr. Bowling with necessary call back information, including the doctor and nurse 

practitioner. Florinda, from Dr. Suh's office reported that the patient has an upcoming 

appointment on July 12th @ 14:30. 

 

Date Signed:  06/18/2018 05:22 PM

Electronically Signed By:Preethi Fritz

## 2018-06-18 NOTE — PDMN
Medical Necessity


Medical necessity: MCG: [id number], Other Psychotic Disorders, Adult: Inpatient 

Care: M1 hold for unspecified psychosis, delusions, danger to self - high risk 

for suicide, and gravely disabled.

## 2018-06-18 NOTE — SOAPPROG
SOAP Progress Note


Assessment/Plan: 


Assessment:





Unspecified psychosis.  No improvement noted. (see subjective/objective note).  

Patient could benefit from continued inpatient hospitalization for crisis 

stabilization, safety, and medication evaluation.  








Plan:





Review psychotropic medication treatment informed consent and recommendations.  

After reviewing treatment options, risk and benefits of treatment, patient 

agrees to continue medications with the following changes.  Medication changes 

include none.  No medication changes at this time as more time is needed to 

determine ongoing tolerability and efficacy.  Plan is to continue to observe 

patient for response and side effects from medications, and ongoing monitoring 

and evaluation.  Before making any medication changes, this interviewer and 

care coordinator to confer with patients endocrinologist.  Next steps are for 

patient to meet with care manager to plan a safe discharge plan and establish 

outpatient services for ongoing treatment.  Consider discharge next week if 

patient is in stable condition, safe, and has a safe discharge plan.   





PSYCHOTROPIC MEDICATION TREATMENT INFORMED CONSENT and RECOMMENDATIONS: Review 

nature of condition, diagnosis, and prognosis.  Review nature and purpose of 

psychotropic medication treatment.  Review type of psychotropic medications 

being ordered.  Review risk and benefits of psychotropic medication treatment.  

Review probable length of time patient will need to take medications.  Review 

risk and benefits of not undergoing psychotropic medication treatment.  Review 

alternative treatments to psychotropic medications.  Review psychotropic 

medications contraindications, drug-drug interactions, side effects, and 

importance of reporting any side effects to a psychiatric provider or nurse 

during inpatient hospitalization, and upon discharge to patients psychiatric 

outpatient provider, primary care provider, or other health care professional.  

Review importance of asking a nurse, psychiatric provider, or primary care 

provider any questions or problems concerning the psychotropic medications.  

Verify patient understands the information that has been provided, and 

understands, accepts, and agrees to psychotropic medications.  





Review patients safety plan and importance of patient to report to staff while 

hospitalized if patient is ever a danger to self/others, or unable to care for 

self, and upon discharge, the importance for patient to contact [facility] or [facility], or go to the nearest emergency room, if patient is ever a 

danger to self/others, or unable to care for self.  Recommend that upon 

discharge patient establish medication management treatment with a psychiatric 

provider, establishes routine therapy appointments, and follow-up with primary 

care provider.  Verify patient understands and agrees to these recommendations.








06/18/18 14:21





Subjective: 


Following up with patient for evaluation of psychosis and safety.  Patient 

reports, Oh, Im doing great.    Patient expresses the following psychiatric 

symptoms none, and reports has improved since admission.  Patient states she 

refuses to take medications.  Patient states she has been attending groups.  

Patient describes having a difficult time sleeping and reports she did not 

sleep last night, but states she still feels rested today.  The patient 

describes her appetite as good, and describes eating all meals.  Patient 

reports her mood as awesome and states this is an improvement since admission.  

Patient denies SI/HI, A/V hallucinations, and delusions.  Patient reports she 

does not know why she is currently hospitalized and does not know why 

recommendations for her to be on medications have been made.





Objective: 





 Vital Signs











Temp Pulse Resp BP Pulse Ox


 


 36.9 C   97   16   133/70 H  96 


 


 06/17/18 06:00  06/18/18 05:50  06/18/18 05:50  06/18/18 05:50  06/18/18 05:50








Treatment team report: Consulted with treatment team staff for update on 

patients progress in treatment.  Nurses report patient is has reported several 

times to them that she is not willing to take any medications at this time.  

Nurses report patient has expressed the following psychiatric symptoms none.  

Staff reports patient slept 0 hours last night.  Patient is eating all meals.  

Patient denies SI/HI, A/V hallucinations, delusions, and has expressed no 

psychiatric symptoms since her admission.  Staff reports they have noticed 

patient has not improved since her admission noting: patient appears to be 

attending to internal stimuli (talking to herself), patient was up all last 

night in the halls, walked into the nurses station, was hugging male patient, 

going into male patients room, making inappropriate calls to police, [facility] staff, 

and others; her feces was found in peanut butter containers and in her pink 

basin in her room.  





Care coordinator is currently reaching out to patients outpatient providers to 

gather more information regarding patients acromegaly diagnosis.  





Family meeting: patient agrees for this interviewer to meet with patient, her 

parents, and care coordinator; and for this interviewer to meet with her 

parents in private without patient present and for care coordinator to meet 

with parents in private without patient present.  Parents and patient respond 

well to discussion regarding patients ongoing treatment. 





The patient is a well-nourished, over-developed, female, looking older than 

chronological age.  Attire is appropriate, hospital garb, and is neat and 

clean.  Grooming status is appropriate.  Ambulation is independent.  Gait is 

normal and coordinated.  Posture is normal.  Eye contact is appropriate.  Motor 

activity is appropriate.  Attitude is uncooperative and defensive.  Patient 

appears disinterested, distracted, and does not relate well to this 

interviewer.  Language production is spontaneous. Rate is rapid, pressured.  

Latency of response is shortened, with happy tone and appropriate volume, and 

amount is appropriate, hypertalkative.  Articulation is clear. Patient reports 

mood as awesome with expansive and inappropriate affect.  Patients thought 

process is non-linear and illogical, with loose associations, tangential thought

, concrete thinking.  Patient does not report suicidal/homicidal thoughts, ideas

, or plans.  Patient denies auditory, visual hallucinations.  Patient denies 

delusions; however, is expressing several delusions to both this interviewer 

and nursing staff.  Patient does appear to be attending to internal stimuli.  

Patient is oriented to person, place, time, and situation.  Attention and 

concentration are poor.  Insight is poor.  Judgment is impaired.  No evidence 

of gross cognitive dysfunction at any point during the interview, and no 

evidence of apparent dysfunction in recent or remote memory noted.  Patient 

does not report undesirable side effects from the medications.











- Time Spent With Patient


Time Spent With Patient: 


45 minutes, met with patient individually.  And at patient's request met with 

patient, patient's parents, and care coordinator for family meeting. 








- Pending Discharge


Pending Discharge Within 24 Hours: No


Pending Discharge Within 48 Hours: No





ICD10 Worksheet


Patient Problems: 


 Problems











Problem Status Onset


 


Psychosis Acute

## 2018-06-18 NOTE — ASMTBHFAM
Notes

 

Note:                         

Notes:

CC participated in a family meeting with EAN Schmid, the parent's of the patient, and the 

patient. We discussed the treatment plan; including her OP providers, legal status, and goals for 

discharge which included medication compliance and stabilization. The patient continued to refused 

medication and demonstrated resistance to treatment. 

 

Date Signed:  06/18/2018 05:40 PM

Electronically Signed By:Preethi Fritz

## 2018-06-19 NOTE — SOAPPROG
SOAP Progress Note


Assessment/Plan: 


Assessment:





Unspecified psychosis.  No improvement noted. (see subjective/objective note).  

Patient could benefit from continued inpatient hospitalization for crisis 

stabilization, safety, and medication evaluation.  





Plan:





Review psychotropic medication treatment informed consent and recommendations.  

After reviewing treatment options, risk and benefits of treatment, patient 

agrees to continue current medications.  No medication changes at this time as 

more time is needed to determine next best treatment steps.  Plan is to 

continue to observe patient and reach-out to patients specialists.  Before 

making any medication changes, this interviewer and care coordinator to confer 

with patients endocrinologist.  Next steps are for patient to meet with care 

manager to plan a safe discharge plan and establish outpatient services for 

ongoing treatment.  Consider discharge next week if patient is in stable 

condition, safe, and has a safe discharge plan.   





PSYCHOTROPIC MEDICATION TREATMENT INFORMED CONSENT and RECOMMENDATIONS: Review 

nature of condition, diagnosis, and prognosis.  Review nature and purpose of 

psychotropic medication treatment.  Review type of psychotropic medications 

being ordered.  Review risk and benefits of psychotropic medication treatment.  

Review probable length of time patient will need to take medications.  Review 

risk and benefits of not undergoing psychotropic medication treatment.  Review 

alternative treatments to psychotropic medications.  Review psychotropic 

medications contraindications, drug-drug interactions, side effects, and 

importance of reporting any side effects to a psychiatric provider or nurse 

during inpatient hospitalization, and upon discharge to patients psychiatric 

outpatient provider, primary care provider, or other health care professional.  

Review importance of asking a nurse, psychiatric provider, or primary care 

provider any questions or problems concerning the psychotropic medications.  

Verify patient understands the information that has been provided, and 

understands, accepts, and agrees to psychotropic medications.  


Review patients safety plan and importance of patient to report to staff while 

hospitalized if patient is ever a danger to self/others, or unable to care for 

self, and upon discharge, the importance for patient to contact [facility] or [facility], or go to the nearest emergency room, if patient is ever a 

danger to self/others, or unable to care for self.  Recommend that upon 

discharge patient establish medication management treatment with a psychiatric 

provider, establishes routine therapy appointments, and follow-up with primary 

care provider.  Verify patient understands and agrees to these recommendations.








06/19/18 11:07





Subjective: 


Following up with patient for evaluation of psychosis and safety.  Patient 

reports, "Doing okay, like the team here.  There's has been really good 

camaraderie.  Hey, this is a really nice office.  I was thinking about ECT."  

Patients states it is a little convoluted why she is here at the hospital.  

Patient states if the hospital had a different building would be better.  

Patient does not answer the question as to why she is here directly.  When 

asked why patient is moving her lips as though she is talking to herself, 

patient states she is not hearing voices, rather channeling.  Patient states 

when she is moving her lips as though talking to herself she is not attending 

to internal stimuli, rather she is trying to figure out what to say.  Patient 

states she is interested in knowing more about ECT and states she is not 

willing to take an additional medications at this time.  





Objective: 





 Vital Signs











Temp Pulse Resp BP Pulse Ox


 


 36.3 C   104 H  16   126/72 H  94 


 


 06/19/18 04:35  06/19/18 04:35  06/19/18 04:35  06/19/18 04:35  06/19/18 04:35








Treatment team report: Consulted with treatment team staff for update on 

patients progress in treatment.  Nurses report patient is refusing medications.

  Nurses report patient has expressed the following psychiatric symptoms none.  

Staff reports patient slept 5.5 hours last night.  Patient is eating all meals.

  Patient denies SI/HI, A/V hallucinations, delusions, and has expressed no 

psychiatric symptoms since her admission.  Staff reports patient has not 

improved since her admission noting: continued decreased need for sleep, 

hyperactivity, hypertalkative, intrusive, irritable, tangential, euphoric, and 

grandiose.  Nurses report patient stated, Everyone here is an leonardo.  Patient 

reported to staff she is channeling to benefit other patients on the unit. 

Nurse reports patient is calling several attorneys, and left a message for an 

 stating, there is going to be a [facility] break at eleven oclock today.





Care coordinator update: Left messages yesterday with patients outpatient 

specialists to request their involvement in patients treatment.  





The patient is a well-nourished, well-developed, female, looking older than 

chronological age.  Attire is appropriate, hospital garb, and is neat and 

clean.  Grooming status is appropriate.  Ambulation is independent.  Gait is 

normal and coordinated.  Posture is normal.  Eye contact is excessive and 

staring.  Motor activity is overactive.  Attitude is uncooperative and 

defensive.  Patient appears disinterested, distracted, and does not relate well 

to this interviewer.  Language production is spontaneous.  Rate is rapid, 

pressured.  Latency of response is shortened, with irritable tone and 

inappropriate high volume, hypertalkative.  Articulation is clear.  Patient 

reports mood as awesome with expansive and inappropriate, euphoric affect.  

Patients thought process is non-linear and illogical, with loose associations, 

tangential thought.  Patient does not report suicidal/homicidal thoughts, ideas

, or plans.  Patient denies auditory, visual hallucinations.  Patient denies 

delusions; however, has reported several delusions to this interviewer and 

staff.  Patient does appear to be attending to internal stimuli.  Patient is 

oriented to person, place, time, and absent to situation.  Attention and 

concentration are poor.  Insight is poor.  Judgment is impaired.  No evidence 

of gross cognitive dysfunction at any point during the interview, and no 

evidence of apparent dysfunction in recent or remote memory noted.  Patient 

does not report undesirable side effects from the medications.











- Time Spent With Patient


Time Spent With Patient: 


30 minutes, met with patient individually.








- Pending Discharge


Pending Discharge Within 24 Hours: No


Pending Discharge Within 48 Hours: No





ICD10 Worksheet


Patient Problems: 


 Problems











Problem Status Onset


 


Psychosis Acute

## 2018-06-20 RX ADMIN — OLANZAPINE SCH: 10 TABLET, ORALLY DISINTEGRATING ORAL at 20:35

## 2018-06-20 NOTE — ASMTCMCOM
CM Note

 

CM Note                       

Notes:

Client remains needy towards this CC and tries to monopolize her time spend with CC.  Pt remains on 


the unit talkaive; however, still delusional regarding certain thoughts.  Not taking meds, etc.  CC 


will follow up with client tomorrow.**

 

Date Signed:  06/20/2018 02:32 PM

Electronically Signed By:Kj Bone

## 2018-06-20 NOTE — GCON
[f 
rep st]



                                                                    CONSULTATION





ENDOCRINOLOGY CONSULTATION



DATE OF CONSULTATION:  2018





CHIEF COMPLAINT:  This is a 38-year-old white female whose chief complaint to 
me today is, "That is a difficult question to answer."



HISTORY OF PRESENT ILLNESS:  I interview the patient, and reviewed the old 
records that I have at this time to try and get the history.  The patient is a 
38-year-old female who admits that she has been not feeling like herself for 
several years at this point in time.  I also find, as I elicit a history from 
her, that in between 3 and 4 years ago she developed a significant paranoia 
about flying, which may have interfered some with her work since she had to fly 
to meetings sometimes as an  for her work. 



Last summer she found she had a melanoma on her abdomen and in September had an 
excision.  As I understand, she has not been able to return to work since that 
time.  She is vague about her problems.  She tells me at one point, "I would 
get a fever.  I would get discombobulated."  She cannot describe in greater 
detail what was wrong with her. 



She had sought a consultation from Barbara Porter MD, an endocrinologist at 
[facility] 3 or 4 years ago.  She has small a right-sided thyroid 
nodule, as I understand it.  She may have had some laboratory testing done at 
that point in time; we are not sure. 



At any rate, no major endocrine problem was found at that point in time.  
However, she returned to see Dr. Porter, for what reason she cannot say, and 
upon meeting Dr. Porter again, Dr. Porter noted a change in her appearance that 
included enlarging of her hands and feet, some frontal bossing and so some 
appearance of acromegaly. 



I have partial laboratory test results, in particular showing a significantly 
elevated IGF-1, consistent with a diagnosis of acromegaly.  I also have 
secondhand reports that she has had a pituitary MRI showing an enlarged 
pituitary gland, perhaps as big as 17 mm, but I do not have the primary reports 
at this point.  I only have secondary reports. 



She had a further consultation from Betzaida Suh M.D., at the [facility].  Dr. Suh confirmed the diagnosis of 
acromegaly.  She suggested a consultation with Scott Gomez M.D., an 
excellent neurosurgeon who specializes in transsphenoidal resection of 
pituitary tumors.  Unfortunately, she has not been able to make or keep an 
appointment with Dr. Gomez. 



In fact, she seemed to get increasingly disorganized and displayed more and 
more unusual behavior so that family members and friends became very concerned 
about her.  She stopped seeking treatment for her pituitary tumor.  Her 
behavior became so erratic, unusual, and concerning, that her family members, 
through the 's office, sought and were given a court ordered 
mental health evaluation. 



She was admitted to the [facility]'s inpatient psychiatric 
unit.  She had been placed on a short-term certification when the court ordered 
evaluation .  It appears she has been prescribed olanzapine 10 mg at 
bedtime to try and help with the unusual behavior.  It is not clear to me if 
she is taking this medication or not.



REVIEW OF SYSTEMS:  She admits to increased acne and increased facial hair.  
She has had some large joint aches and pains.  She has not had any cough, 
wheezing, or shortness of breath.  She has not had any chest pain or 
significant palpitations.  She has had some headaches, but she does not think 
they are unusual.  She does think her teeth are little farther spaced apart 
than they used to be.  She admits she has had a little bit of low appetite, but 
overall she has not had significant gastrointestinal distress, no nausea, 
vomiting, diarrhea, or constipation.  She has noted increased sweating.  She 
exercises regularly and admits that her exercise tolerance is down some.



PHYSICAL EXAMINATION:  GENERAL:  She is awake and alert when I go to see her.  
She has an acromegalic appearance.  VITAL SIGNS:  Today a pulse was 72 and 
blood pressure 115/65.  She has not had markedly elevated blood pressures, nor 
marked tachycardia here in the hospital.  NECK:  Supple.  She does have a small 
right-sided thyroid nodule.  I am unable to feel any abnormalities on the left 
side of the thyroid.  Her eye movements are intact.  She does not have a stare, 
nor lid lag, nor chemosis.  LUNGS:  Clear to auscultation and percussion.  HEART
:  Regular and without extra sounds.  ABDOMEN:  Soft, nontender, and there are 
no organomegaly or masses to a brief examination.  EXTREMITIES:  Her hands are 
large and fleshy.  Her feet are the same.  Her large joints are unremarkable to 
my examination today.



SOCIAL HISTORY:  She does not smoke cigarettes.  She admits to about 6 
alcoholic drinks per week.  She admits that she uses cannabis, telling me she 
inhales cannabinoid oil and does so at least 3 days per week, likely more.



ALLERGIES:  She had she denies any allergies to any medications.



MEDICATIONS:  She denies being on any prescription medications.  She has taken 
multiple supplements, but is not receiving them here in the hospital.



PAST MEDICAL HISTORY:  Acromegaly and melanoma on abdomen.



PAST PSYCHIATRIC HISTORY:  This is her first inpatient psychiatric 
hospitalization.  She has seen a therapist in the past, but does not appear to 
have seen a psychiatrist in the past.



MENTAL STATUS EXAMINATION:  I would note separately that with me her speech was 
pressured, and modestly rapid.  She had a significant amount of grandiosity to 
her thinking.  Her thought processes were tangential and it was difficult to 
keep her on target.  She did not appear to respond to internal stimuli during 
the interview.  She had many odd and unusual beliefs, but no clear paranoia 
during the interview with me.



IMPRESSION:  The patient is a 38-year-old female who appears to have been 
appropriately diagnosed with acromegaly.  I have not seen all the primary data, 
but the data that do have and the physical examination is strongly suggestive 
of acromegaly.  I agree with current plan to have her see Scott Gomez M.D., 
for a transsphenoidal resection of her pituitary tumor. 



The other issue with this patient is her mental illness diagnosis.  I reviewed 
her chart and do not see a definitive diagnosis.  She has been admitted and 
continues to be diagnosed with psychosis NOS.  The history I obtained from this 
patient is that she has odd and unusual behavior going back at least 3 or 4 
years, possibly longer.  I doubt that this is an actually an acute change in 
her mental state.  This appears to be more an exacerbation of an underlying 
abnormal mental state.  My strong suspicion is that this is bipolar mood 
disorder, which has finally resulted in a manic episode.  I think that would be 
very consistent with some of the observations of increased activity, impulsivity
, paranoia, and perhaps decreased need for sleep as well.



SUGGESTIONS:  My suggestion would be to treat with appropriate antipsychotic 
mood stabilizing medications and consider a mood stabilizer as well.  Effects 
of dopamine blocking on this particular tumor are not of great consequence at 
this point in time.  It will not make the situation acutely worse.  If we want 
to, we can consider using a somatostatin analog to try and suppress the growth 
hormone production, but at this point in time, I do not think it is absolutely 
necessary.



DIAGNOSES:  

1.  Acromegaly.

2.  Bipolar mood disorder, currently manic, provisional.



PLAN:  

1.  Treat the psychotic jasiel with appropriate medications as chosen by the 
primary psychiatric team.

2.  If the patient's mental state clears, I would suggest resuming the plan to 
treat the acromegaly by transsphenoidal pituitary surgery.





Job #:  [id number]

MTDD

## 2018-06-20 NOTE — SOAPPROG
SOAP Progress Note


Assessment/Plan: 


Assessment:





Unspecified psychosis.  No improvement noted. (see subjective/objective note).  

Patient could benefit from continued inpatient hospitalization for crisis 

stabilization, safety, and medication evaluation.  





Plan:





Review psychotropic medication treatment informed consent and recommendations.  

After reviewing treatment options, risk and benefits of treatment, patient 

agrees to continue current medications with following change: Zyprexa Zydis 10 

mg po QHS.  No other medication changes at this time as more time is needed to 

determine ongoing tolerability and efficacy.  Plan is to continue to observe 

patient and coordinate care with endocrinologists.  Next steps are for patient 

to meet with care manager to plan a safe discharge plan and establish 

outpatient services for ongoing treatment.  Consider discharge next week if 

patient is in stable condition, safe, and has a safe discharge plan.   





PSYCHOTROPIC MEDICATION TREATMENT INFORMED CONSENT and RECOMMENDATIONS: Review 

nature of condition, diagnosis, and prognosis.  Review nature and purpose of 

psychotropic medication treatment.  Review type of psychotropic medications 

being ordered.  Review risk and benefits of psychotropic medication treatment.  

Review probable length of time patient will need to take medications.  Review 

risk and benefits of not undergoing psychotropic medication treatment.  Review 

alternative treatments to psychotropic medications.  Review psychotropic 

medications contraindications, drug-drug interactions, side effects, and 

importance of reporting any side effects to a psychiatric provider or nurse 

during inpatient hospitalization, and upon discharge to patients psychiatric 

outpatient provider, primary care provider, or other health care professional.  

Review importance of asking a nurse, psychiatric provider, or primary care 

provider any questions or problems concerning the psychotropic medications.  

Verify patient understands the information that has been provided, and 

understands, accepts, and agrees to psychotropic medications.  





Review patients safety plan and importance of patient to report to staff while 

hospitalized if patient is ever a danger to self/others, or unable to care for 

self, and upon discharge, the importance for patient to contact [facility] or [facility], or go to the nearest emergency room, if patient is ever a 

danger to self/others, or unable to care for self.  Recommend that upon 

discharge patient establish medication management treatment with a psychiatric 

provider, establishes routine therapy appointments, and follow-up with primary 

care provider.  Verify patient understands and agrees to these recommendations.








06/20/18 12:29





Subjective: 


Following up with patient for evaluation of psychosis and safety.  Patient 

reports, "Doing really, really, well.  Think I need to contact a .

"  Patients states she is not willing to take Zyprexa 10 mg po QHS, and states 

she just needs a little more time to think about it.  She does agree to Zyprexa 

Zydis 10 mg po QHS.  Patient expresses all psychiatric symptoms and states she 

has no idea why she is still here.





Objective: 





 Vital Signs











Temp Pulse Resp BP Pulse Ox


 


 36.9 C   104 H  16   115/65   96 


 


 06/20/18 06:00  06/19/18 04:35  06/20/18 06:00  06/20/18 06:00  06/20/18 06:00








Treatment team report: Consulted with treatment team staff for update on 

patients progress in treatment.  Nurses report patient refused Zyprexa 10 mg po 

QHS.  Nurses report patient has expressed the following psychiatric symptoms 

none.  Staff reports patient slept 5 hours last night.  Patient is eating all 

meals.  Patient denies SI/HI, A/V hallucinations, delusions, and has expressed 

no psychiatric symptoms since her admission.  Staff reports patient has not 

improved since her admission noting: continued decreased need for sleep, 

hyperactivity, hypertalkative, intrusive, tangential, euphoric, grandiose, and 

delusional. 





Patient to meet with endocrinologist/psychiatrist this afternoon on unit.





Dr. Bowling director of pituitary program at [facility] agrees with 

plan for antipsychotic treatment and reports acromegaly does not cause 

psychosis.  





The patient is a well-nourished, well-developed, female, looking older than 

chronological age.  Attire is appropriate, hospital garb, and is neat and 

clean.  Grooming status is appropriate.  Ambulation is independent.  Gait is 

normal and coordinated.  Posture is normal.  Eye contact is excessive and 

staring.  Motor activity is overactive.  Attitude is uncooperative and 

defensive.  Patient appears disinterested, distracted, and does not relate well 

to this interviewer.  Language production is spontaneous.  Rate is rapid, 

pressured.  Latency of response is shortened, with irritable tone and 

inappropriate high volume, hypertalkative.  Articulation is clear.  Patient 

reports mood as fantastic with expansive and inappropriate, euphoric affect.  

Patients thought process is non-linear and illogical, with loose associations, 

tangential thought.  Patient does not report suicidal/homicidal thoughts, ideas

, or plans.  Patient denies auditory, visual hallucinations.  Patient denies 

delusions; however, has reported several delusions to this interviewer and 

staff.  Patient does appear to be attending to internal stimuli.  Patient is 

oriented to person, place, time, and absent to situation.  Attention and 

concentration are poor.  Insight is poor.  Judgment is impaired.  No evidence 

of gross cognitive dysfunction at any point during the interview, and no 

evidence of apparent dysfunction in recent or remote memory noted.  Patient 

does not report undesirable side effects from the medications.

















- Time Spent With Patient


Time Spent With Patient: 


30 minutes, met with patient individually.








- Pending Discharge


Pending Discharge Within 24 Hours: No


Pending Discharge Within 48 Hours: No





ICD10 Worksheet


Patient Problems: 


 Problems











Problem Status Onset


 


Psychosis Acute

## 2018-06-21 RX ADMIN — OLANZAPINE SCH MG: 10 TABLET, ORALLY DISINTEGRATING ORAL at 19:43

## 2018-06-21 NOTE — SOAPPROG
SOAP Progress Note


Assessment/Plan: 


Assessment:


























Plan:





Subjective: 





Pt continues to refuse PO meds.  This is based in her ongoing grave disability 

and is evidence of ongoing incapacity.  Will petition for COM.


Objective: 





 Vital Signs











Temp Pulse Resp BP Pulse Ox


 


 36.6 C   96   14   132/89 H  97 


 


 06/21/18 14:40  06/21/18 14:40  06/21/18 14:40  06/21/18 14:40  06/21/18 14:40














ICD10 Worksheet


Patient Problems: 


 Problems











Problem Status Onset


 


Psychosis Acute

## 2018-06-21 NOTE — SOAPPROG
SOAP Progress Note


Assessment/Plan: 


Assessment:





Unspecified psychosis.  No improvement noted. (see subjective/objective note).  

Patient could benefit from continued inpatient hospitalization for crisis 

stabilization, safety, and medication evaluation.  





Plan:





Review psychotropic medication treatment informed consent and recommendations.  

After reviewing treatment options, risk and benefits of treatment, patient 

agrees to continue current medications.  No other medication changes at this 

time as more time is needed to determine ongoing tolerability and efficacy.  

Plan is to continue to observe patient and coordinate care with 

endocrinologists.  Next steps are for patient to meet with care manager to plan 

a safe discharge plan and establish outpatient services for ongoing treatment.  

Consider discharge next week if patient is in stable condition, safe, and has a 

safe discharge plan.   





PSYCHOTROPIC MEDICATION TREATMENT INFORMED CONSENT and RECOMMENDATIONS: Review 

nature of condition, diagnosis, and prognosis.  Review nature and purpose of 

psychotropic medication treatment.  Review type of psychotropic medications 

being ordered.  Review risk and benefits of psychotropic medication treatment.  

Review probable length of time patient will need to take medications.  Review 

risk and benefits of not undergoing psychotropic medication treatment.  Review 

alternative treatments to psychotropic medications.  Review psychotropic 

medications contraindications, drug-drug interactions, side effects, and 

importance of reporting any side effects to a psychiatric provider or nurse 

during inpatient hospitalization, and upon discharge to patients psychiatric 

outpatient provider, primary care provider, or other health care professional.  

Review importance of asking a nurse, psychiatric provider, or primary care 

provider any questions or problems concerning the psychotropic medications.  

Verify patient understands the information that has been provided, and 

understands, accepts, and agrees to psychotropic medications.  





Review patients safety plan and importance of patient to report to staff while 

hospitalized if patient is ever a danger to self/others, or unable to care for 

self, and upon discharge, the importance for patient to contact [facility] or [facility], or go to the nearest emergency room, if patient is ever a 

danger to self/others, or unable to care for self.  Recommend that upon 

discharge patient establish medication management treatment with a psychiatric 

provider, establishes routine therapy appointments, and follow-up with primary 

care provider.  Verify patient understands and agrees to these recommendations.








06/21/18 13:39





Subjective: 


Following up with patient for evaluation of psychosis and safety.  Patient 

reports, I am so better, and so ready to leave.  Yes!  Doing really, really, 

well.  Thank you.  When asked reason for patient refusing Zyprexa Zydis 10 mg HS

, patient states, Well, actually I want more time think about it, and am 

considering crushing up the pill and then applying it topically to my body.  

Patient states she likes to try all forms of medicine; however, does agree to 

continue Zyprexa and states she will take the medication this evening before 

bedtime. 





Objective: 





 Vital Signs











Temp Pulse Resp BP Pulse Ox


 


 36.6 C   105 H  14   113/65   98 


 


 06/21/18 06:20  06/21/18 06:20  06/21/18 06:20  06/21/18 06:20  06/21/18 06:20








Treatment team report: Consulted with treatment team staff for update on 

patients progress in treatment.  Nurses report patient refused Zyprexa Zydis 10 

mg po QHS.  Nurses report patient has expressed the following psychiatric 

symptoms none.  Staff reports patient slept 6.5 hours last night.  Patient is 

eating all meals.  Patient denies SI/HI, A/V hallucinations, delusions, and has 

expressed no psychiatric symptoms since her admission.  Staff reports patient 

has not improved since her admission noting: continued decreased need for sleep

, hyperactivity, hypertalkative, intrusive, tangential, euphoric, grandiose, 

and delusional. 





The patient is a well-nourished, well-developed, female, looking older than 

chronological age.  Attire is appropriate, hospital garb, and is neat and 

clean.  Grooming status is appropriate.  Ambulation is independent.  Gait is 

normal and coordinated.  Posture is normal.  Eye contact is excessive and 

staring.  Motor activity is overactive.  Attitude is uncooperative and 

defensive.  Patient appears disinterested, distracted, and does not relate well 

to this interviewer.  Language production is spontaneous.  Rate is rapid, 

pressured.  Latency of response is shortened, with irritable tone and 

inappropriate high volume, hypertalkative.  Articulation is clear.  Patient 

reports mood as perfect with expansive and inappropriate, euphoric affect.  

Patients thought process is non-linear and illogical, with loose associations, 

tangential thought.  Patient does not report suicidal/homicidal thoughts, ideas

, or plans.  Patient denies auditory, visual hallucinations.  Patient denies 

delusions; however, has reported several delusions to this interviewer and 

staff.  Patient does appear to be attending to internal stimuli.  Patient is 

oriented to person, place, time, and absent to situation.  Attention and 

concentration are poor.  Insight is poor.  Judgment is impaired.  No evidence 

of gross cognitive dysfunction at any point during the interview, and no 

evidence of apparent dysfunction in recent or remote memory noted.  Patient 

does not report undesirable side effects from the medications.











- Time Spent With Patient


Time Spent With Patient: 


20 minutes, met with patient individually.








- Pending Discharge


Pending Discharge Within 24 Hours: No


Pending Discharge Within 48 Hours: No





ICD10 Worksheet


Patient Problems: 


 Problems











Problem Status Onset


 


Psychosis Acute

## 2018-06-22 RX ADMIN — OLANZAPINE SCH MG: 10 TABLET, ORALLY DISINTEGRATING ORAL at 21:33

## 2018-06-22 NOTE — SOAPPROG
SOAP Progress Note


Assessment/Plan: 


Assessment:





Unspecified psychosis.  No improvement noted. (see subjective/objective note).  

Patient could benefit from continued inpatient hospitalization for crisis 

stabilization, safety, and medication evaluation.  





Plan:





Review psychotropic medication treatment informed consent and recommendations.  

After reviewing treatment options, risk and benefits of treatment, patient 

agrees to continue current medications.  No other medication changes at this 

time as more time is needed to determine ongoing tolerability and efficacy.  

Plan is to continue to observe patient and coordinate care with 

endocrinologists.  Next steps are for patient to meet with care manager to plan 

a safe discharge plan and establish outpatient services for ongoing treatment.  

Consider discharge next week if patient is in stable condition, safe, and has a 

safe discharge plan.   





PSYCHOTROPIC MEDICATION TREATMENT INFORMED CONSENT and RECOMMENDATIONS: Review 

nature of condition, diagnosis, and prognosis.  Review nature and purpose of 

psychotropic medication treatment.  Review type of psychotropic medications 

being ordered.  Review risk and benefits of psychotropic medication treatment.  

Review probable length of time patient will need to take medications.  Review 

risk and benefits of not undergoing psychotropic medication treatment.  Review 

alternative treatments to psychotropic medications.  Review psychotropic 

medications contraindications, drug-drug interactions, side effects, and 

importance of reporting any side effects to a psychiatric provider or nurse 

during inpatient hospitalization, and upon discharge to patients psychiatric 

outpatient provider, primary care provider, or other health care professional.  

Review importance of asking a nurse, psychiatric provider, or primary care 

provider any questions or problems concerning the psychotropic medications.  

Verify patient understands the information that has been provided, and 

understands, accepts, and agrees to psychotropic medications.  





Review patients safety plan and importance of patient to report to staff while 

hospitalized if patient is ever a danger to self/others, or unable to care for 

self, and upon discharge, the importance for patient to contact [facility] or [facility], or go to the nearest emergency room, if patient is ever a 

danger to self/others, or unable to care for self.  Recommend that upon 

discharge patient establish medication management treatment with a psychiatric 

provider, establishes routine therapy appointments, and follow-up with primary 

care provider.  Verify patient understands and agrees to these recommendations.








06/22/18 14:05





Subjective: 


Following up with patient for evaluation of psychosis and safety.  Patient 

reports, "I took the medication last night."  Patient states she plans to 

continue Zyprexa Zydis 10 mg po QHS.  Patient reports she is tolerating this 

medication with no report of side effects.  Patient reports she slept really 

well last night, and states the medication, "Got rid of the baddies."  Patient 

states she is thinking more clearly today.  When asked about discharge plans, 

patient states, "Goal should be to discharge her, she is taking the meds and 

she is now ready to go." 





Objective: 





 Vital Signs











Temp Pulse Resp BP Pulse Ox


 


 36.6 C   84   13   113/69   97 


 


 06/22/18 06:00  06/22/18 06:00  06/22/18 06:00  06/22/18 06:00  06/22/18 06:00








Treatment team report: Consulted with treatment team staff for update on 

patients progress in treatment.  Nurses report patient did take Zyprexa Zydis 

10 mg po QHS.  Nurses report patient has expressed the following psychiatric 

symptoms none.  Staff reports patient slept 9 hours last night.  Patient is 

eating all meals.  Patient denies SI/HI, A/V hallucinations, delusions, and has 

expressed no psychiatric symptoms since her admission.  Staff reports patient 

has improved since her admission noting: medication adherence and improved sleep

; continued hyperactivity, hypertalkative, intrusive, tangential, euphoric, 

grandiose, and delusional. 





This interviewer met with patient and patients mother at request of patient.  

Mother and patient responded well to this meeting, and family continues to be 

supportive of patients treatment.





Notable bizarre behavior: Patient referring to herself in 3rd person.





The patient is a well-nourished, well-developed, female, looking older than 

chronological age.  Attire is appropriate, hospital garb, and is neat and 

clean.  Grooming status is appropriate.  Ambulation is independent.  Gait is 

normal and coordinated.  Posture is normal.  Eye contact is excessive and 

staring.  Motor activity is overactive.  Attitude is uncooperative and 

defensive.  Patient appears disinterested, distracted, and does not relate well 

to this interviewer.  Language production is spontaneous.  Rate is rapid, 

pressured.  Latency of response is shortened, with irritable tone and 

inappropriate high volume, hypertalkative.  Articulation is clear.  Patient 

reports mood as great with expansive and inappropriate, euphoric affect.  

Patients thought process is non-linear and illogical, with loose associations, 

tangential thought.  Patient does not report suicidal/homicidal thoughts, ideas

, or plans.  Patient denies auditory, visual hallucinations.  Patient denies 

delusions; however, has reported several delusions to this interviewer and 

staff.  Patient does appear to be attending to internal stimuli.  Patient is 

oriented to person, place, time, and absent to situation.  Attention and 

concentration are poor.  Insight is poor.  Judgment is impaired.  No evidence 

of gross cognitive dysfunction at any point during the interview, and no 

evidence of apparent dysfunction in recent or remote memory noted.  Patient 

does not report undesirable side effects from the medications.











- Time Spent With Patient


Time Spent With Patient: 


35 minutes, met with patient individually and patient and patient's mother per 

patient's request.








- Pending Discharge


Pending Discharge Within 24 Hours: No


Pending Discharge Within 48 Hours: No





ICD10 Worksheet


Patient Problems: 


 Problems











Problem Status Onset


 


Psychosis Acute

## 2018-06-23 RX ADMIN — OLANZAPINE SCH MG: 10 TABLET, ORALLY DISINTEGRATING ORAL at 21:00

## 2018-06-23 NOTE — SOAPPROG
SOAP Progress Note


Assessment/Plan: 


Assessment:








39 yo woman with approx 6 mos h/o psychotic delusions and bizarre behaviors. 

She was dx in 4/2018 with acromegaly secondary to pituitary adenoma. She was dx 

by Dr. Betzaida Bowling, Director of Pituitary Adrenal and Neuroendocrine Tumor 

program at [facility]. Dr. Suh recommended transphenoidal microsurgery 

followed by medication tx with a somatostatin or GH receptor antagonist, with 

radiation as 3rd line tx option. Dr. Suh wrote in letter from 6/7/18, that 

"as an , I would have expected her to have read on line about the tumor 

and signs and symptoms. Instead, Xiao was delusional focusing on purifying 

her body with an antimold cleansing and taking large amounts of alternative 

supplements and vitamins." Dr. Suh concluded that patient's "paranoia and 

delusions are not a result of the pituitary tumor." 





Patient's parents, boyfriend and sister wrote letters to court requesting 

involuntary commitment for a psychiatric evaluation. Patient was transferred 

from [facility] ED to . 





Per Binu Julian's note:


Unspecified psychosis.  No improvement noted. (see subjective/objective note).  

Patient could benefit from continued inpatient hospitalization for crisis 

stabilization, safety, and medication evaluation.  





06/23/18 16:27


1. Patient has consented to take Zyprexa Zydis 10mg HS for psychosis. 


2. Patient does not exhibit pressured speech, racing thoughts, decreased need 

for sleep, increase in goal-directed activity, elated mood. 


3. Continues to have paranoid delusions and bizarre thoughts. 


4. Slept 6.5 hrs last night, eating appropriately. 


5. On [facility]. Primary team recommends petitioning court for involuntary meds. 


Subjective: 


Met with patient, reviewed chart and d/w staff. Patient told staff she wants to 

"heal my brain" by putting a "chastity belt" around her brain to prevent anyone 

from "hacking" into her brain. She told MD that the medication she is taking, 

Zyprexa, is to "cover the hub," which she says means "something like wearing a 

baseball cap for protection." Patient also told CC that she is "channeling" her 

"soul mate, soul maker and internal master healer." Patient denies any SI/HI. 





Objective: 





 Vital Signs











Temp Pulse Resp BP Pulse Ox


 


 36.8 C   96   14   99/57 L  97 


 


 06/23/18 06:00  06/23/18 06:00  06/23/18 06:00  06/23/18 06:00  06/23/18 06:00








MSE: Affect: Euthymic  Mood: "Great"  TP: MEGAN, disorganized, irrational  TC: 

Denies any SI/HI, "channeling," paranoia and delusions  Insight/Judgment: 

Impaired





- Time Spent With Patient


Time Spent With Patient: 


20"








- Pending Discharge


Pending Discharge Within 24 Hours: No


Pending Discharge Within 48 Hours: No





ICD10 Worksheet


Patient Problems: 


 Problems











Problem Status Onset


 


Psychosis Acute

## 2018-06-23 NOTE — ASMTBHDC
Notes

 

Note:                         

Notes:

Pt. reports feeling "good". Pt. stated she is sleeping a lot, which is good for her. Pt. stated her 


"body is figuring out" her new medication, adding she believes she will "need to take for a little 

bit". At this point in conversation, pt. began speaking in the third person. Pt. denied SI, HI, and 


paranoia. Pt. stated she experiences AH through "channeling". Pt. reports her "trusted sources" are 


her "soulmate, maker, and internal master healer". Pt. stated "we want to make sure this brain get 

healed" and pointed to her head. Pt. stated through channeling angels and her trusted sources, she 

has been working to save the world, and shared with CC the world will not be ending. Pt. stated "I 

am God". Pt. stated this CC's reward for helping pt. to leave this weekend, will be getting to meet 


her soulmate. Pt. stated "you are a big part of saving the world" to CC. 



Pt. presents as delusional, grandiose, with good eye contact and lacking insight. Pt. is medication 


compliant and slept 6.5 hours. 

 

Date Signed:  06/23/2018 02:02 PM

Electronically Signed By:Florinda Liang

## 2018-06-24 RX ADMIN — OLANZAPINE SCH MG: 10 TABLET, ORALLY DISINTEGRATING ORAL at 21:10

## 2018-06-24 RX ADMIN — ACETAMINOPHEN PRN MG: 325 TABLET ORAL at 15:55

## 2018-06-24 NOTE — ASMTBHDC
Notes

 

Note:                         

Notes:

Pt. reports feeling "good". Pt. stated the medication are "still moving stuff [phlegm] out of my 

brain". Pt. stated she slept "pretty good". Pt. stated "I know you guys are fighting against his 

[MD] habits". Pt. explained about how she knows the MD doesn't usually discharge on the 

weekends. Pt. stated discharging her today "makes the most sense". Pt. denied SI, HI, VH, and 

paranoia. Pt. stated she experiences AH through "channeling" and calls it "my 

direction". Pt. stated the medication is helping, adding "It's quieting down, it's 

calm". Pt. stated "there were a lot of entities trying to direct me". 



Pt. approached  later asking if she can give staff her housing information as she will be 

traveling, and wants to offer her place to peer pts in need of housing. Pt. stated she plans to 

travel to [place] with her soulmate, Gerry, although she doesn't know where he is right now. 

 

Date Signed:  06/24/2018 02:20 PM

Electronically Signed By:Florinda Liang

## 2018-06-24 NOTE — SOAPPROG
SOAP Progress Note


Assessment/Plan: 


Assessment:








39 yo woman with approx 6 mos h/o psychotic delusions and bizarre behaviors. 

She was dx in 4/2018 with acromegaly secondary to pituitary adenoma. She was dx 

by Dr. Betzaida Bowling, Director of Pituitary Adrenal and Neuroendocrine Tumor 

program at [facility]. Dr. Suh recommended transphenoidal microsurgery 

followed by medication tx with a somatostatin or GH receptor antagonist, with 

radiation as 3rd line tx option. Dr. Suh wrote in letter from 6/7/18, that 

"as an , I would have expected her to have read on line about the tumor 

and signs and symptoms. Instead, Xiao was delusional focusing on purifying 

her body with an antimold cleansing and taking large amounts of alternative 

supplements and vitamins." Dr. Suh concluded that patient's "paranoia and 

delusions are not a result of the pituitary tumor." 





Patient's parents, boyfriend and sister wrote letters to court requesting 

involuntary commitment for a psychiatric evaluation. Patient was transferred 

from [facility] ED to . 





Per Binu Julian's note:


Unspecified psychosis.  No improvement noted. (see subjective/objective note).  

Patient could benefit from continued inpatient hospitalization for crisis 

stabilization, safety, and medication evaluation.  





06/23/18 16:27


1. Patient has consented to take Zyprexa Zydis 10mg HS for psychosis. 


2. Patient does not exhibit pressured speech, racing thoughts, decreased need 

for sleep, increase in goal-directed activity, elated mood. 


3. Continues to have paranoid delusions and bizarre thoughts. 


4. Slept 6.5 hrs last night, eating appropriately. 


5. On STC. Primary team recommends petitioning court for involuntary meds. 





06/24/18 15:43


1. Patient compliant with Zyprexa.


2. Slept 7.5 hrs last night. 


3. Reports brain is "quieting down." 


4. Would like to use tweezers for facial hair. 


5. Awaiting COM hearing. 


Subjective: 


Met with patient, reviewed chart and d/w staff. Patient says Zyprexa is "helping

" b/c it "clears out my brain." Patient told CC that she is not hearing the 

voices she has been "channeling" as much today. She thinks this is b/c her 

brain has started to "quiet down." Patient continues to get more sleep on unit 

than at home. 





Objective: 





 Vital Signs











Temp Pulse Resp BP Pulse Ox


 


 37.1 C   80   16   103/61   97 


 


 06/24/18 06:00  06/24/18 06:00  06/24/18 06:00  06/24/18 06:00  06/24/18 06:00








MSE: Affect: Euthymic  Mood: "OK"  TP: Loose, FOI  TC: Denies any SI/HI, 

"channeling" voices are "quieter" today, paranoid and grandiose delusions  

Insight/Judgment: Impaired





- Time Spent With Patient


Time Spent With Patient: 


15"








- Pending Discharge


Pending Discharge Within 24 Hours: No


Pending Discharge Within 48 Hours: No





ICD10 Worksheet


Patient Problems: 


 Problems











Problem Status Onset


 


Psychosis Acute

## 2018-06-25 RX ADMIN — OLANZAPINE SCH MG: 10 TABLET, ORALLY DISINTEGRATING ORAL at 21:58

## 2018-06-25 NOTE — ASMTBHDC
Notes

 

Note:                         

Notes:

CC speaks to client briefly today during check-in.  Client appears to be tearful, unclean with 

pressured speech.  Client appears confused still disorganized, pleasant but unstable.  Client going 


to court later today for COM appearance.  It was suggested by treatment team that client could be 

responding to internal stimuli, etc.  Will know more after court appearance later today or in 

rounds tomorrow per doctor.**

 

Date Signed:  06/25/2018 11:59 AM

Electronically Signed By:Kj Bone

## 2018-06-25 NOTE — ASMTBHDC
Notes

 

Note:                         

Notes:

CC was able to confirm tentative discharge intake appt with [facility] for:











Follow up with:







[facility]



[address] , [address]



[facility] [address] [phone]







Intake Appt: Tuesday July 3rd, (07/03/18) @ 10:45 am**

 

Date Signed:  06/25/2018 09:00 AM

Electronically Signed By:Kj Bone

## 2018-06-25 NOTE — SOAPPROG
SOAP Progress Note


Assessment/Plan: 


Assessment:





Unspecified psychosis.  No improvement noted. (see subjective/objective note).  

Ongoing S/S psychosis, patient is not safe to discharge, and requires continued 

inpatient level of care.  Patient could benefit from continued inpatient 

hospitalization for crisis stabilization, safety, and medication evaluation.  





Plan:





Review psychotropic medication treatment informed consent and recommendations.  

After reviewing treatment options, risk and benefits of treatment, patient 

agrees to continue current medications and agrees to start Zyprexa Zydis 5 mg 

po Q4HRS PRN for agitation/psychosis.  No other medication changes at this time 

as more time is needed to determine ongoing tolerability and efficacy.  Plan is 

to continue to observe patient and coordinate care with endocrinologists.  Next 

steps are for patient to meet with care manager to plan a safe discharge plan 

and establish outpatient services for ongoing treatment.  Consider discharge 

next week if patient is in stable condition, safe, and has a safe discharge 

plan.   





PSYCHOTROPIC MEDICATION TREATMENT INFORMED CONSENT and RECOMMENDATIONS: Review 

nature of condition, diagnosis, and prognosis.  Review nature and purpose of 

psychotropic medication treatment.  Review type of psychotropic medications 

being ordered.  Review risk and benefits of psychotropic medication treatment.  

Review probable length of time patient will need to take medications.  Review 

risk and benefits of not undergoing psychotropic medication treatment.  Review 

alternative treatments to psychotropic medications.  Review psychotropic 

medications contraindications, drug-drug interactions, side effects, and 

importance of reporting any side effects to a psychiatric provider or nurse 

during inpatient hospitalization, and upon discharge to patients psychiatric 

outpatient provider, primary care provider, or other health care professional.  

Review importance of asking a nurse, psychiatric provider, or primary care 

provider any questions or problems concerning the psychotropic medications.  

Verify patient understands the information that has been provided, and 

understands, accepts, and agrees to psychotropic medications.  


Review patients safety plan and importance of patient to report to staff while 

hospitalized if patient is ever a danger to self/others, or unable to care for 

self, and upon discharge, the importance for patient to contact [facility] or [facility], or go to the nearest emergency room, if patient is ever a 

danger to self/others, or unable to care for self.  Recommend that upon 

discharge patient establish medication management treatment with a psychiatric 

provider, establishes routine therapy appointments, and follow-up with primary 

care provider.  Verify patient understands and agrees to these recommendations.








06/25/18 11:21





Subjective: 


Following up with patient for evaluation of psychosis and safety.  Patient 

reports, "When I was up in [facility], I was nominated to be God, this was just 

over 39 years ago.  She agreed because somebody had to do it, somebody had to 

save the world.  Tasha, we don't want you to say that later today, come on 

Tasha, only say it in these four walls."  Patient states she plans to continue 

Zyprexa Zydis 10 mg po QHS.  Patient reports she is tolerating this medication 

with no report of side effects.  Patient reports she slept well last night, and 

states the medication the is beneficial in clearing her thoughts.  Patient 

requests Zyprexa Zydis as needed throughout the day as states it will "help 

slow her brain and keep away the baddies."  





Objective: 





 Vital Signs











Temp Pulse Resp BP Pulse Ox


 


 36.6 C   81   14   107/68   97 


 


 06/25/18 06:00  06/25/18 06:00  06/25/18 06:00  06/25/18 06:00  06/25/18 06:00








Treatment team report: Consulted with treatment team staff for update on 

patients progress in treatment.  Nurses report patient continues to Zyprexa 

Zydis 10 mg po QHS as prescribed.  Nurses report patient has expressed the 

following psychiatric symptoms none, and shows no insight and poor judgement.  

Staff reports patient slept 6 hours last night.  Patient is eating all meals.  

Patient denies SI/HI, A/V hallucinations, delusions, and has expressed no 

psychiatric symptoms since her admission.  Staff reports patient has improved 

since her admission noting: medication adherence and improved sleep; continued 

hyperactivity, hypertalkative, tangential, euphoric, grandiose, and delusional. 





Notable bizarre behavior: Patient referring to herself in 3rd person and 

reports she was nominated to be God.





The patient is a well-nourished, well-developed, female, looking older than 

chronological age.  Attire is appropriate, hospital garb, and is neat and 

clean.  Grooming status is appropriate.  Ambulation is independent.  Gait is 

normal and coordinated.  Posture is normal.  Eye contact is excessive and 

staring.  Motor activity is overactive.  Attitude is uncooperative and 

defensive.  Patient appears disinterested, distracted, and does not relate well 

to this interviewer.  Language production is spontaneous.  Rate is rapid, 

pressured.  Latency of response is shortened, with irritable tone and 

inappropriate high volume, hypertalkative.  Articulation is clear.  Patient 

reports mood as great with expansive and inappropriate, euphoric affect.  

Patients thought process is non-linear and illogical, with loose associations, 

tangential thought.  Patient does not report suicidal/homicidal thoughts, ideas

, or plans.  Patient denies auditory, visual hallucinations.  Patient denies 

delusions; however, has reported several delusions to this interviewer and 

staff.  Patient does appear to be attending to internal stimuli.  Patient is 

oriented to person, place, time, and absent to situation.  Attention and 

concentration are poor.  Insight is poor.  Judgment is impaired.  No evidence 

of gross cognitive dysfunction at any point during the interview, and no 

evidence of apparent dysfunction in recent or remote memory noted.  Patient 

does not report undesirable side effects from the medications.











- Time Spent With Patient


Time Spent With Patient: 


20 minutes, met with patient individually.








- Pending Discharge


Pending Discharge Within 24 Hours: No


Pending Discharge Within 48 Hours: No





ICD10 Worksheet


Patient Problems: 


 Problems











Problem Status Onset


 


Psychosis Acute

## 2018-06-26 RX ADMIN — OLANZAPINE PRN MG: 5 TABLET, ORALLY DISINTEGRATING ORAL at 16:58

## 2018-06-26 RX ADMIN — Medication PRN MG: at 21:51

## 2018-06-26 RX ADMIN — OLANZAPINE SCH MG: 5 TABLET, ORALLY DISINTEGRATING ORAL at 21:15
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
## 2018-06-27 RX ADMIN — CETIRIZINE HYDROCHLORIDE SCH MG: 10 TABLET, FILM COATED ORAL at 11:44

## 2018-06-27 RX ADMIN — OLANZAPINE SCH MG: 5 TABLET, ORALLY DISINTEGRATING ORAL at 21:35

## 2018-06-27 RX ADMIN — OLANZAPINE PRN MG: 5 TABLET, ORALLY DISINTEGRATING ORAL at 16:32

## 2018-06-27 NOTE — SOAPPROG
SOAP Progress Note


Assessment/Plan: 


Assessment:





Unspecified psychosis.  No improvement noted. (see subjective/objective note).  

Ongoing S/S psychosis, patient is not safe to discharge, and requires continued 

inpatient level of care.  Patient could benefit from continued inpatient 

hospitalization for crisis stabilization, safety, and medication evaluation.  





Plan:





Review psychotropic medication treatment informed consent and recommendations.  

After reviewing treatment options, risk and benefits of treatment, patient 

agrees to continue current medications and agrees to start Zyrtec 10 mg po QD.  

No other medication changes at this time as more time is needed to determine 

ongoing tolerability and efficacy.  Plan is to continue to observe patient for 

response to treatment.  Next steps are for patient to meet with care manager to 

plan a safe discharge plan and establish outpatient services for ongoing 

treatment.  Consider discharge next week if patient is in stable condition, safe

, and has a safe discharge plan.   





PSYCHOTROPIC MEDICATION TREATMENT INFORMED CONSENT and RECOMMENDATIONS: Review 

nature of condition, diagnosis, and prognosis.  Review nature and purpose of 

psychotropic medication treatment.  Review type of psychotropic medications 

being ordered.  Review risk and benefits of psychotropic medication treatment.  

Review probable length of time patient will need to take medications.  Review 

risk and benefits of not undergoing psychotropic medication treatment.  Review 

alternative treatments to psychotropic medications.  Review psychotropic 

medications contraindications, drug-drug interactions, side effects, and 

importance of reporting any side effects to a psychiatric provider or nurse 

during inpatient hospitalization, and upon discharge to patients psychiatric 

outpatient provider, primary care provider, or other health care professional.  

Review importance of asking a nurse, psychiatric provider, or primary care 

provider any questions or problems concerning the psychotropic medications.  

Verify patient understands the information that has been provided, and 

understands, accepts, and agrees to psychotropic medications.  





Review patients safety plan and importance of patient to report to staff while 

hospitalized if patient is ever a danger to self/others, or unable to care for 

self, and upon discharge, the importance for patient to contact [facility] or [facility], or go to the nearest emergency room, if patient is ever a 

danger to self/others, or unable to care for self.  Recommend that upon 

discharge patient establish medication management treatment with a psychiatric 

provider, establishes routine therapy appointments, and follow-up with primary 

care provider.  Verify patient understands and agrees to these recommendations.





06/27/18 12:06





Subjective: 


Following up with patient for evaluation of psychosis and safety.  Patient 

reports, "I would really like to go outside."  Patient reports Zyprexa is 

"giving me clarity."  Patient states she does not want to provide any further 

details regarding how Zyprexa is benefiting her and is adamant about reviewing 

a list she brought with her.  Patient presents with a list of supplements she 

requests to review with this interviewer.  This interviewer listens as patient 

reads her supplement list and description of each supplement.  Patient requests 

this list be placed in her chart.  Patient requests Zertec for allergies.  

Patient agrees to start Zertec 10 mg po QD.  





Objective: 





 Vital Signs











Temp Pulse Resp BP Pulse Ox


 


 36.6 C   106 H  14   118/56 L  96 


 


 06/25/18 06:00  06/27/18 09:34  06/27/18 09:34  06/27/18 09:34  06/27/18 09:34








Treatment team report: Consulted with treatment team staff for update on 

patients progress in treatment.  Nurses report patient continues to Zyprexa 

Zydis 10 mg po QHS as prescribed.  Nurses report patient continues to show no 

insight and poor judgement.  Staff reports patient slept 7.5 hours last night.  

Patient is eating all meals.  Patient denies SI/HI, A/V hallucinations, 

delusions, and has expressed no psychiatric symptoms since her admission.  

Staff reports patient has improved since her admission noting: medication 

adherence and improved sleep; continued disorganized and delusional. 





Patient continues to refer to herself in the 3rd person both verbally and in 

writing.





The patient is a well-nourished, well-developed, female, looking older than 

chronological age.  Attire is appropriate, hospital garb, and is neat and 

clean.  Grooming status is appropriate.  Ambulation is independent.  Gait is 

normal and coordinated.  Posture is normal.  Eye contact is excessive and 

staring.  Motor activity is overactive.  Attitude is uncooperative and 

defensive.  Patient relates well to this interviewer.  R/R/V normal.  Patient 

reports mood as great with euphoric affect.  Patients thought process is non-

linear and illogical, with loose associations, tangential thought.  Patient 

does not report suicidal/homicidal thoughts, ideas, or plans.  Patient denies 

auditory, visual hallucinations.  Patient denies delusions; however, has 

reported several delusions to this interviewer and staff.  Patient does appear 

to be attending to internal stimuli.  Patient is oriented to person, place, time

, and absent to situation.  Attention and concentration are poor.  Insight is 

poor.  Judgment is impaired.  Patient does not report undesirable side effects 

from the medications.











- Time Spent With Patient


Time Spent With Patient: 


25 minutes, met with patient individually.








- Pending Discharge


Pending Discharge Within 24 Hours: No


Pending Discharge Within 48 Hours: No





ICD10 Worksheet


Patient Problems: 


 Problems











Problem Status Onset


 


Psychosis Acute

## 2018-06-28 RX ADMIN — CETIRIZINE HYDROCHLORIDE SCH MG: 10 TABLET, FILM COATED ORAL at 09:15

## 2018-06-28 RX ADMIN — IBUPROFEN PRN MG: 200 TABLET, FILM COATED ORAL at 16:57

## 2018-06-28 RX ADMIN — SALINE NASAL SPRAY PRN SPRAY: 1.5 SOLUTION NASAL at 14:28

## 2018-06-28 RX ADMIN — FOLIC ACID SCH MG: 1 TABLET ORAL at 14:19

## 2018-06-28 RX ADMIN — Medication SCH MG: at 14:19

## 2018-06-28 RX ADMIN — OLANZAPINE SCH MG: 5 TABLET, ORALLY DISINTEGRATING ORAL at 20:41

## 2018-06-28 RX ADMIN — OLANZAPINE PRN MG: 5 TABLET, ORALLY DISINTEGRATING ORAL at 14:26

## 2018-06-28 NOTE — SOAPPROG
SOAP Progress Note


Assessment/Plan: 


Assessment:





Unspecified psychosis.  Slight improvement noted. (see subjective/objective note

).  Ongoing S/S psychosis, patient is not safe to discharge, and requires 

continued inpatient level of care.  Patient could benefit from continued 

inpatient hospitalization for crisis stabilization, safety, and medication 

evaluation.  





Plan:





Review psychotropic medication treatment informed consent and recommendations.  

After reviewing treatment options, risk and benefits of treatment, patient 

agrees to continue current medications and agrees to start folate 1 mg po QD 

and fish oil 1,000 mg po QD.  No other medication changes at this time as more 

time is needed to determine ongoing tolerability and efficacy.  Plan is to 

continue to observe patient for response to treatment.  Next steps are for 

patient to meet with care manager to plan a safe discharge plan and establish 

outpatient services for ongoing treatment.  Consider discharge next week if 

patient is in stable condition, safe, and has a safe discharge plan.   











PSYCHOTROPIC MEDICATION TREATMENT INFORMED CONSENT and RECOMMENDATIONS: Review 

nature of condition, diagnosis, and prognosis.  Review nature and purpose of 

psychotropic medication treatment.  Review type of psychotropic medications 

being ordered.  Review risk and benefits of psychotropic medication treatment.  

Review probable length of time patient will need to take medications.  Review 

risk and benefits of not undergoing psychotropic medication treatment.  Review 

alternative treatments to psychotropic medications.  Review psychotropic 

medications contraindications, drug-drug interactions, side effects, and 

importance of reporting any side effects to a psychiatric provider or nurse 

during inpatient hospitalization, and upon discharge to patients psychiatric 

outpatient provider, primary care provider, or other health care professional.  

Review importance of asking a nurse, psychiatric provider, or primary care 

provider any questions or problems concerning the psychotropic medications.  

Verify patient understands the information that has been provided, and 

understands, accepts, and agrees to psychotropic medications.  





Review patients safety plan and importance of patient to report to staff while 

hospitalized if patient is ever a danger to self/others, or unable to care for 

self, and upon discharge, the importance for patient to contact [facility] or [facility], or go to the nearest emergency room, if patient is ever a 

danger to self/others, or unable to care for self.  Recommend that upon 

discharge patient establish medication management treatment with a psychiatric 

provider, establishes routine therapy appointments, and follow-up with primary 

care provider.  Verify patient understands and agrees to these recommendations.





06/28/18 13:38





Subjective: 


Following up with patient for evaluation of psychosis and safety.  Patient 

reports, "I am ready."  Patient reports Zyprexa is "calming things down."  

Patient states she is eager for treatment, and is willing to try whatever 

treatment options are suggested to her.  Patient states she "needs a  to 

know how to manage channeling...probably David Wynn.  Looking forward to his 

calls."  Patient describes challenging as, "When somebody gets some type of 

messaging from conduits."  Patient states she has a hard time explaining what 

it is exactly.  Patient requests folate and fish oil.





Objective: 





 Vital Signs











Temp Pulse Resp BP Pulse Ox


 


 36.6 C   106 H  14   118/56 L  96 


 


 06/25/18 06:00  06/27/18 09:34  06/27/18 09:34  06/27/18 09:34  06/27/18 09:34








Treatment team report: Consulted with treatment team staff for update on 

patients progress in treatment.  Nurses report patient continues to Zyprexa 

Zydis 15 mg po QHS as prescribed.  Nurses report patient continues to show no 

insight and poor judgement.  Staff reports patient slept 12 hours last night.  

Patient is eating all meals.  Patient denies SI/HI, A/V hallucinations, 

delusions, and has expressed no psychiatric symptoms since her admission.  

Staff reports patient has improved since her admission noting: medication 

adherence and improved sleep; continued disorganized and delusional.  Patient 

continues to refer to herself in the 3rd person both verbally and in writing.





The patient is a well-nourished, well-developed, female, looking older than 

chronological age.  Attire is appropriate, hospital garb, and is neat and 

clean.  Grooming status is appropriate.  Ambulation is independent.  Gait is 

normal and coordinated.  Posture is normal.  Eye contact is excessive and 

staring.  Motor activity is overactive.  Attitude is uncooperative and 

defensive.  Patient relates well to this interviewer.  R/R/V normal.  Patient 

reports mood as great with euphoric affect.  Patients thought process is non-

linear and illogical, with loose associations, tangential thought.  Patient 

does not report suicidal/homicidal thoughts, ideas, or plans.  Patient denies 

auditory, visual hallucinations.  Patient denies delusions; however, has 

reported several delusions to this interviewer and staff.  Patient does appear 

to be attending to internal stimuli.  Patient is oriented to person, place, time

, and absent to situation.  Attention and concentration are poor.  Insight is 

poor.  Judgment is impaired.  Patient does not report undesirable side effects 

from the medications.











- Time Spent With Patient


Time Spent With Patient: 


20 minutes, met with patient individually.








- Pending Discharge


Pending Discharge Within 24 Hours: No


Pending Discharge Within 48 Hours: No





ICD10 Worksheet


Patient Problems: 


 Problems











Problem Status Onset


 


Psychosis Acute

## 2018-06-29 RX ADMIN — Medication PRN MG: at 22:30

## 2018-06-29 RX ADMIN — VITAMIN D, TAB 1000IU (100/BT) SCH UNITS: 25 TAB at 15:37

## 2018-06-29 RX ADMIN — OLANZAPINE PRN MG: 5 TABLET, ORALLY DISINTEGRATING ORAL at 13:43

## 2018-06-29 RX ADMIN — OLANZAPINE SCH MG: 5 TABLET, ORALLY DISINTEGRATING ORAL at 20:49

## 2018-06-29 RX ADMIN — IBUPROFEN PRN MG: 200 TABLET, FILM COATED ORAL at 22:25

## 2018-06-29 RX ADMIN — IBUPROFEN PRN MG: 200 TABLET, FILM COATED ORAL at 13:40

## 2018-06-29 RX ADMIN — CETIRIZINE HYDROCHLORIDE SCH MG: 10 TABLET, FILM COATED ORAL at 08:28

## 2018-06-29 RX ADMIN — ASCORBIC ACID TAB 250 MG SCH MG: 250 TAB at 15:37

## 2018-06-29 RX ADMIN — Medication SCH MG: at 08:28

## 2018-06-29 RX ADMIN — FOLIC ACID SCH MG: 1 TABLET ORAL at 08:28

## 2018-06-29 NOTE — ASMTBHDC
Notes

 

Note:                         

Notes:

CC speaks to client after Dr. Willett from [facility] speaks to client.  Per 

client, she would like to participate in [facility]'s services, etc.  Per Doctor, client could be 


admitted as soon as Thursday of next week (07/05/18).  CC provided information to [place] team.**

 

Date Signed:  06/29/2018 01:59 PM

Electronically Signed By:Kj Bone

## 2018-06-29 NOTE — SOAPPROG
SOAP Progress Note


Assessment/Plan: 


Assessment:





Unspecified psychosis.  Slight improvement noted. (see subjective/objective note

).  Ongoing S/S psychosis, patient is not safe to discharge, and requires 

continued inpatient level of care.  Patient could benefit from continued 

inpatient hospitalization for crisis stabilization, safety, and medication 

evaluation.  





Plan:





Review psychotropic medication treatment informed consent and recommendations.  

After reviewing treatment options, risk and benefits of treatment, patient 

agrees to continue current medications and agrees to start vitamin C and 

vitamin D3.  No other medication changes at this time as more time is needed to 

determine ongoing tolerability and efficacy.  Plan is to continue to observe 

patient for response to treatment.  Next steps are for patient to meet with 

care manager to plan a safe discharge plan and establish outpatient services 

for ongoing treatment.  Consider discharge next Thursday if patient is in 

stable condition, safe, and has a safe discharge plan.   





PSYCHOTROPIC MEDICATION TREATMENT INFORMED CONSENT and RECOMMENDATIONS: Review 

nature of condition, diagnosis, and prognosis.  Review nature and purpose of 

psychotropic medication treatment.  Review type of psychotropic medications 

being ordered.  Review risk and benefits of psychotropic medication treatment.  

Review probable length of time patient will need to take medications.  Review 

risk and benefits of not undergoing psychotropic medication treatment.  Review 

alternative treatments to psychotropic medications.  Review psychotropic 

medications contraindications, drug-drug interactions, side effects, and 

importance of reporting any side effects to a psychiatric provider or nurse 

during inpatient hospitalization, and upon discharge to patients psychiatric 

outpatient provider, primary care provider, or other health care professional.  

Review importance of asking a nurse, psychiatric provider, or primary care 

provider any questions or problems concerning the psychotropic medications.  

Verify patient understands the information that has been provided, and 

understands, accepts, and agrees to psychotropic medications.  





Review patients safety plan and importance of patient to report to staff while 

hospitalized if patient is ever a danger to self/others, or unable to care for 

self, and upon discharge, the importance for patient to contact [facility] or 911, or go to the nearest emergency room, if patient is ever a 

danger to self/others, or unable to care for self.  Recommend that upon 

discharge patient establish medication management treatment with a psychiatric 

provider, establishes routine therapy appointments, and follow-up with primary 

care provider.  Verify patient understands and agrees to these recommendations.





06/29/18 13:46





Subjective: 


Following up with patient for evaluation of psychosis and safety.  Patient 

reports, My meeting with Dr. Khan went really well.  [facility] seems 

like a great next step for me.  A lot more independent.  Get to cook breakfast.

  Patient requests vitamin C and vitamin D3 to be added. 





Objective: 





 Vital Signs











Temp Pulse Resp BP Pulse Ox


 


 36.6 C   106 H  14   118/56 L  96 


 


 06/25/18 06:00  06/27/18 09:34  06/27/18 09:34  06/27/18 09:34  06/27/18 09:34








Treatment team report: Consulted with treatment team staff for update on 

patients progress in treatment.  Nurses report patient continues to Zyprexa 

Zydis 15 mg po QHS as prescribed.  Nurses report patient continues to show no 

insight and poor judgement.  Staff reports patient slept 7 hours last night.  

Patient is eating all meals.  Patient denies SI/HI, A/V hallucinations, 

delusions, and has expressed no psychiatric symptoms since her admission.  

Staff reports patient has improved since her admission noting: medication 

adherence, improved sleep, less tangential and hyperactive.  Continued 

disorganized and delusional.  





Patient with Dr. Khan today to discuss treatment at [facility].  Dr. Khan reports patient would be a good fit for [facility] program and 

they have an opening next Thursday.  





The patient is a well-nourished, well-developed, female, looking older than 

chronological age.  Attire is appropriate, hospital garb, and is neat and 

clean.  Grooming status is appropriate.  Ambulation is independent.  Gait is 

normal and coordinated.  Posture is normal.  Eye contact is excessive and 

staring.  Motor activity is overactive.  Attitude is uncooperative and 

defensive.  Patient relates well to this interviewer.  R/R/V normal.  Patient 

reports mood as great with euphoric affect.  Patients thought process is non-

linear and illogical, with loose associations.  Patient no longer 

hypertalkative and decreased tangentiality.  Patient does not report suicidal/

homicidal thoughts, ideas, or plans.  Patient denies auditory, visual 

hallucinations.  Patient denies delusions; however, has reported several 

delusions to this interviewer and staff.  Patient does appear to be attending 

to internal stimuli.  Patient is oriented to person, place, time, and absent to 

situation.  Attention and concentration are poor.  Insight is poor.  Judgment 

is impaired.  Patient does not report undesirable side effects from the 

medications.











- Time Spent With Patient


Time Spent With Patient: 


25 minutes, met with patient individually.








- Pending Discharge


Pending Discharge Within 24 Hours: No


Pending Discharge Within 48 Hours: No





ICD10 Worksheet


Patient Problems: 


 Problems











Problem Status Onset


 


Psychosis Acute

## 2018-06-30 RX ADMIN — VITAMIN D, TAB 1000IU (100/BT) SCH UNITS: 25 TAB at 09:12

## 2018-06-30 RX ADMIN — IBUPROFEN PRN MG: 200 TABLET, FILM COATED ORAL at 20:05

## 2018-06-30 RX ADMIN — MAGNESIUM HYDROXIDE PRN ML: 400 SUSPENSION ORAL at 11:03

## 2018-06-30 RX ADMIN — ASCORBIC ACID TAB 250 MG SCH MG: 250 TAB at 09:12

## 2018-06-30 RX ADMIN — Medication PRN MG: at 20:05

## 2018-06-30 RX ADMIN — Medication SCH MG: at 09:11

## 2018-06-30 RX ADMIN — FOLIC ACID SCH MG: 1 TABLET ORAL at 09:11

## 2018-06-30 RX ADMIN — OLANZAPINE PRN MG: 5 TABLET, ORALLY DISINTEGRATING ORAL at 11:05

## 2018-06-30 RX ADMIN — ACETAMINOPHEN PRN MG: 325 TABLET ORAL at 11:03

## 2018-06-30 RX ADMIN — OLANZAPINE SCH MG: 5 TABLET, ORALLY DISINTEGRATING ORAL at 20:05

## 2018-06-30 RX ADMIN — CETIRIZINE HYDROCHLORIDE SCH MG: 10 TABLET, FILM COATED ORAL at 09:11

## 2018-06-30 NOTE — SOAPPROG
SOAP Progress Note


Assessment/Plan: 


Assessment:


37yo CF with unspecified psychosis and acromegaly due to pituitary tumor, on 

STC. Was admitted on c.o. eval requested by family due to increasing bizarre 

behaviors








06/30/18 17:00  


Staff report pt slept 7.5hr last night. Pt has been given papers for family 

being legal guardian. Met with Dr. Gordon from [facility] yesterday who felt 

she would be appropriate for their program and will have bed avail on 7/5.





On eval, pt was eager to show diagram and timeline she created to show some 

symptoms, physician contacts, and parents' "interference" with her medical 

care. She perseverated on her exposure to mold in 2015, and then on her parents 

continued attempts to find out more information about her medical care- "they 

called the clinic and pretended to be me to get my records...now they're banned 

from the clinic...", and then talked about how they "found a loophole" to get a 

court-order to get her hospitalized. 


Does not feel she needs to be in hospital, "I heal better outside." Talked of 

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 Also that her psychologist Glory 

will come to visit today. 


Reports no med s/e, adding that she likes the meds "because it gets the 

detritus and gunk out...like limbic exhaust..."


Looking forward to going to [facility]. 


MSE: calm, cooperative, with good eye contact, nml speech rate/vol, mood- would 

be better if not in hospital, affect controlled even when talking about 

frustrations with her parents, "I know it's out of love". thoughts generally 

perseverative on mold exposure, not needing to be in hospital, and not wanting 

to be under parents' control. denied ah/vh. denied si/hi. talked some of L 

wrist scar she caused with cutting self several months ago she thinks while in 

hypnotic state b/c does not recall feeling suicidal and has no idea why she 

would cause harm to herself. insight poor, jdgmt impaired.





PLAN:


continue zyprexa zydis 15mg hs and monitor for improvement. 


continue restrictions (phone, ISB-1) related to inappropriate behaviors a few 

days ago


cont on STC. plan d/c 7/5 to [facility] 


f/u with endocrinology and neurosurg as outpt per endocrinology consult 

recommendations


 





Objective: 





 Vital Signs











Temp Pulse Resp BP Pulse Ox


 


 36.6 C   106 H  14   118/56 L  96 


 


 06/25/18 06:00  06/27/18 09:34  06/27/18 09:34  06/27/18 09:34  06/27/18 09:34














- Time Spent With Patient


Time Spent With Patient: 





40min





- Pending Discharge


Pending Discharge Within 24 Hours: No


Pending Discharge Within 48 Hours: No





ICD10 Worksheet


Patient Problems: 


 Problems











Problem Status Onset


 


Psychosis Acute

## 2018-07-01 RX ADMIN — VITAMIN D, TAB 1000IU (100/BT) SCH UNITS: 25 TAB at 08:54

## 2018-07-01 RX ADMIN — Medication SCH MG: at 21:05

## 2018-07-01 RX ADMIN — ASCORBIC ACID TAB 250 MG SCH MG: 250 TAB at 08:54

## 2018-07-01 RX ADMIN — SALINE NASAL SPRAY PRN SPRAY: 1.5 SOLUTION NASAL at 10:59

## 2018-07-01 RX ADMIN — Medication PRN MG: at 21:05

## 2018-07-01 RX ADMIN — FOLIC ACID SCH MG: 1 TABLET ORAL at 08:53

## 2018-07-01 RX ADMIN — IBUPROFEN PRN MG: 200 TABLET, FILM COATED ORAL at 21:08

## 2018-07-01 RX ADMIN — OLANZAPINE SCH MG: 5 TABLET, ORALLY DISINTEGRATING ORAL at 21:05

## 2018-07-01 RX ADMIN — Medication SCH MG: at 08:54

## 2018-07-01 RX ADMIN — VITAMIN D, TAB 1000IU (100/BT) SCH UNITS: 25 TAB at 21:05

## 2018-07-01 RX ADMIN — ASCORBIC ACID TAB 250 MG SCH MG: 250 TAB at 21:05

## 2018-07-01 RX ADMIN — OLANZAPINE PRN MG: 5 TABLET, ORALLY DISINTEGRATING ORAL at 10:58

## 2018-07-01 RX ADMIN — CETIRIZINE HYDROCHLORIDE SCH MG: 10 TABLET, FILM COATED ORAL at 08:54

## 2018-07-01 RX ADMIN — IBUPROFEN PRN MG: 200 TABLET, FILM COATED ORAL at 10:56

## 2018-07-01 NOTE — SOAPPROG
SOAP Progress Note


Assessment/Plan: 


Assessment:


39yo CF with unspecified psychosis and acromegaly due to pituitary tumor, on 

STC. Was admitted on c.o. eval requested by family due to increasing bizarre 

behaviors








06/30/18 17:00  


Staff report pt slept 7.5hr last night. Pt has been given papers for family 

being legal guardian. Met with Dr. Gordon from [facility] yesterday who felt 

she would be appropriate for their program and will have bed avail on 7/5.





On eval, pt was eager to show diagram and timeline she created to show some 

symptoms, physician contacts, and parents' "interference" with her medical 

care. She perseverated on her exposure to mold in 2015, and then on her parents 

continued attempts to find out more information about her medical care- "they 

called the clinic and pretended to be me to get my records...now they're banned 

from the clinic...", and then talked about how they "found a loophole" to get a 

court-order to get her hospitalized. 


Does not feel she needs to be in hospital, "I heal better outside." Talked of 

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 Also that her psychologist Glory 

will come to visit today. 


Reports no med s/e, adding that she likes the meds "because it gets the 

detritus and gunk out...like limbic exhaust..."


Looking forward to going to [facility]. 


MSE: calm, cooperative, with good eye contact, nml speech rate/vol, mood- would 

be better if not in hospital, affect controlled even when talking about 

frustrations with her parents, "I know it's out of love". thoughts generally 

perseverative on mold exposure, not needing to be in hospital, and not wanting 

to be under parents' control. denied ah/vh. denied si/hi. talked some of L 

wrist scar she caused with cutting self several months ago she thinks while in 

hypnotic state b/c does not recall feeling suicidal and has no idea why she 

would cause harm to herself. insight poor, jdgmt impaired.





PLAN:


continue zyprexa zydis 15mg hs and monitor for improvement. 


continue restrictions (phone, ISB-1) related to inappropriate behaviors a few 

days ago


cont on STC. plan d/c 7/5 to [facility] 


f/u with neurosurg as outpt per endocrinology consult recommendations


 


07/01/18 20:21


slept 8hr. no acute issues.





States had good meeting with her therapist Glory who came to visit on unit 

yesterday. States this therapist is also her mother's therapist. 


Some perseveration on HIPPA violation attempts by her mother, but was 

redirectable. 


Asking for decrease Zyprexa to 10mg bc sedation, but likes prn Ativan. Also 

took melatonin. Recommended avoid ativan prn and even melatonin if feeling 

tired in am, rather just continue with 15mg zyprexa. pt agreed. has noted 

benefit with her thinking on zyprexa. asking for BID dosing of her vit C, fish 

oil, and folate, and zyrtec for allergies. thinks one eyelid is a little puffy. 

or wants a different antihistamine like benadryl. asks if I know of a good 

hypnotherapist she can see after d/c. 


hoping to leave before 7/4. aware she has avail bed at [facility] on 7/5.





MSE: calm, cooperative, kempt, casually dressed, good ec, nml speech rate/vol. 

mood "okay", affect restricted, thoughts without overt delusions but 

conversation was kept focused. denied ah/vh or any si. i/j both impaired. 





PLAN:


-continue zyprexa zydis 15mg hs. discussed making healthy food choices if 

noting incr appetite with this med


-continue restrictions (phone, ISB-1) related to inappropriate behaviors a few 

days ago. eval for lifting these tomorrow and monitor as d/c date approaches.


-cont on STC. plan d/c 7/5 to CR 


-f/u with neurosurg as outpt per endocrinology consult recommendations


-okay to change Vit D3, vit C and Omega 3 supplements to BID as per pt request


-note, using Ativan 1-1.5mg total daily as prn. 


-discuss antihistamine requests with primary team








Objective: 





 Vital Signs











Temp Pulse Resp BP Pulse Ox


 


 36.6 C   106 H  14   118/56 L  96 


 


 06/25/18 06:00  06/27/18 09:34  06/27/18 09:34  06/27/18 09:34  06/27/18 09:34














- Time Spent With Patient


Time Spent With Patient: 





20min





- Pending Discharge


Pending Discharge Within 24 Hours: No


Pending Discharge Within 48 Hours: No





ICD10 Worksheet


Patient Problems: 


 Problems











Problem Status Onset


 


Psychosis Acute

## 2018-07-01 NOTE — ASMTBHDC
Notes

 

Note:                         

Notes:

Patient was seen after attending group and she continues to present as if she wants to look good 

and displays no insight.



CC had extensive conversation with patient's mother Tawanda.  She seemed anxious and agitated on the 

phone with a variety of questions and concerns about patient's discharge planning including whether 


patient will be able to go to [facility] and if not what will be an alternate plan.  Patient's 

mother spoke of issues related to patient's insurance, the cost of [facility] and patient's 

short-term certification and what happens with this when she is discharged from the hospital.  She 

also expressed concern about MD appointments scheduled for July 12th related to pituitary tumor and 


whether/how to get patient to these appointments.  



Patient's mother would like to meet with CC and EAN Schmid on Monday, JUly 2nd at 1pm.  From 

11:30-1pm, patient's mother has arranged a family session to meet with patient and in attendance 

will be patient's parents and her community therapist, Ace Draper.  Patient's mother was concerned 


that this would not be allowed due to it be ing scheduled just prior to visiting hours.  Patient's 

mother was reassured that accommodations can be made for appointments such as these and MOC asked 

for phone call from CC on Monday morning to confirm this would be OK.  Patient's MOC was also 

advised to reach out to CC on Monday morning re: this family meeting. 







 

 

Date Signed:  07/01/2018 02:04 PM

Electronically Signed By:aMyte Lopez

## 2018-07-01 NOTE — ASMTBHDC
Notes

 

Note:                         

Notes:

Initially patient appears pleasant and her thoughts appear linear and goal directed.  Over the 

course of this conversation it became clear that patient's thogughts are not based in reality as 

she insisted her mother is not her legal guardian, she refuses to see her mother in person and will 


only communicate with her in small doses by phone.  Patient brought out a flow chart to show CC in 

which it delineates her treatment over the past 4 years and also she spoke of a number of instances 


where her parents were dismissed from tx appointments and/or violated patient's HIPPA 

rights.  Patient was also seeking assistance from this CC in helping her attain insurance through 

[employer] and CC gave patient the phone number only to find out after doing this that 

patient is on phone restrictions.

 

Date Signed:  07/01/2018 01:02 PM

Electronically Signed By:Mayte Lopez

## 2018-07-02 RX ADMIN — Medication SCH MG: at 20:57

## 2018-07-02 RX ADMIN — VITAMIN D, TAB 1000IU (100/BT) SCH UNITS: 25 TAB at 20:56

## 2018-07-02 RX ADMIN — Medication PRN MG: at 20:56

## 2018-07-02 RX ADMIN — ASCORBIC ACID TAB 250 MG SCH MG: 250 TAB at 21:04

## 2018-07-02 RX ADMIN — ASCORBIC ACID TAB 250 MG SCH MG: 250 TAB at 08:45

## 2018-07-02 RX ADMIN — Medication SCH MG: at 08:46

## 2018-07-02 RX ADMIN — OLANZAPINE SCH MG: 5 TABLET, ORALLY DISINTEGRATING ORAL at 20:56

## 2018-07-02 RX ADMIN — CETIRIZINE HYDROCHLORIDE SCH MG: 10 TABLET, FILM COATED ORAL at 06:47

## 2018-07-02 RX ADMIN — FOLIC ACID SCH MG: 1 TABLET ORAL at 08:45

## 2018-07-02 RX ADMIN — VITAMIN D, TAB 1000IU (100/BT) SCH UNITS: 25 TAB at 08:45

## 2018-07-02 NOTE — SOAPPROG
SOAP Progress Note


Assessment/Plan: 


Assessment:





Unspecified psychosis.  Slight improvement noted. (see subjective/objective note

).  Ongoing S/S psychosis, patient is not safe to discharge, and requires 

continued inpatient level of care.  Patient could benefit from continued 

inpatient hospitalization for crisis stabilization, safety, and medication 

evaluation.  





Plan:





Review psychotropic medication treatment informed consent and recommendations.  

After reviewing treatment options, risk and benefits of treatment, patient 

agrees to continue current medications and agrees to Benadryl 25 mg po Q6HRS 

PRN for allergies.  No other medication changes at this time as more time is 

needed to determine ongoing tolerability and efficacy.  Plan is to continue to 

observe patient for response to treatment.  Next steps are for patient to meet 

with care manager to plan a safe discharge plan and establish outpatient 

services for ongoing treatment.  Consider discharge next Thursday if patient is 

in stable condition, safe, and has a safe discharge plan.   





PSYCHOTROPIC MEDICATION TREATMENT INFORMED CONSENT and RECOMMENDATIONS: Review 

nature of condition, diagnosis, and prognosis.  Review nature and purpose of 

psychotropic medication treatment.  Review type of psychotropic medications 

being ordered.  Review risk and benefits of psychotropic medication treatment.  

Review probable length of time patient will need to take medications.  Review 

risk and benefits of not undergoing psychotropic medication treatment.  Review 

alternative treatments to psychotropic medications.  Review psychotropic 

medications contraindications, drug-drug interactions, side effects, and 

importance of reporting any side effects to a psychiatric provider or nurse 

during inpatient hospitalization, and upon discharge to patients psychiatric 

outpatient provider, primary care provider, or other health care professional.  

Review importance of asking a nurse, psychiatric provider, or primary care 

provider any questions or problems concerning the psychotropic medications.  

Verify patient understands the information that has been provided, and 

understands, accepts, and agrees to psychotropic medications.  





Review patients safety plan and importance of patient to report to staff while 

hospitalized if patient is ever a danger to self/others, or unable to care for 

self, and upon discharge, the importance for patient to contact [facility] or [facility], or go to the nearest emergency room, if patient is ever a 

danger to self/others, or unable to care for self.  Recommend that upon 

discharge patient establish medication management treatment with a psychiatric 

provider, establishes routine therapy appointments, and follow-up with primary 

care provider.  Verify patient understands and agrees to these recommendations.





07/02/18 10:49





Subjective: 


Following up with patient for evaluation of psychosis and safety.  Patient 

reports, "Think the next step ([facility]) in plan is the next best step in my 

treatment."  





Objective: 





 Vital Signs











Temp Pulse Resp BP Pulse Ox


 


 36.6 C   106 H  14   118/56 L  96 


 


 06/25/18 06:00  06/27/18 09:34  06/27/18 09:34  06/27/18 09:34  06/27/18 09:34








Treatment team report: Consulted with treatment team staff for update on 

patients progress in treatment.  Nurses report patient continues to Zyprexa 

Zydis 15 mg po QHS as prescribed.  Nurses report patient continues to show no 

insight and poor judgement.  Staff reports patient slept 9 hours last night.  

Patient is eating all meals.  Patient denies SI/HI, A/V hallucinations, 

delusions, and has expressed no psychiatric symptoms since her admission.  

Staff reports patient has improved since her admission noting: medication 

adherence, improved sleep, less tangential and hyperactive.  Continued 

disorganized and delusional.  Nurses report patient has requested Benadryl for 

allergies, and has had one dose at 25 mg with good response and toleration.  





Patient has agreed to continue treatment at [facility] 

after discharge.





The patient is a well-nourished, well-developed, female, looking older than 

chronological age.  Attire is appropriate, hospital garb, and is neat and 

clean.  Grooming status is appropriate.  Ambulation is independent.  Gait is 

normal and coordinated.  Posture is normal.  Eye contact is appropriate.  Motor 

activity is appropriate and coordinated.  Attitude is cooperative and friendly.

  Patient relates well to this interviewer.  R/R/V normal.  Patient reports 

mood as going well with euphoric affect.  Patients thought process is non-

linear and illogical, with loose associations.  Patient no longer presents 

tangential and hypertalkative.  Patient does not report suicidal/homicidal 

thoughts, ideas, or plans.  Patient denies auditory, visual hallucinations.  

Patient denies delusions; however, has reported several delusions to this 

interviewer and staff.  Patient does not appear to be attending to internal 

stimuli.  Patient is oriented to person, place, time, and absent to situation.  

Attention and concentration are poor.  Insight is poor.  Judgment is impaired.  

Patient does not report undesirable side effects from the medications.











- Time Spent With Patient


Time Spent With Patient: 


25 minutes, met with patient individually.








- Pending Discharge


Pending Discharge Within 24 Hours: No


Pending Discharge Within 48 Hours: No





ICD10 Worksheet


Patient Problems: 


 Problems











Problem Status Onset


 


Psychosis Acute

## 2018-07-03 RX ADMIN — VITAMIN D, TAB 1000IU (100/BT) SCH UNITS: 25 TAB at 18:57

## 2018-07-03 RX ADMIN — ASCORBIC ACID TAB 250 MG SCH MG: 250 TAB at 08:48

## 2018-07-03 RX ADMIN — CETIRIZINE HYDROCHLORIDE SCH MG: 10 TABLET, FILM COATED ORAL at 08:48

## 2018-07-03 RX ADMIN — MAGNESIUM HYDROXIDE PRN ML: 400 SUSPENSION ORAL at 18:57

## 2018-07-03 RX ADMIN — Medication SCH MG: at 08:48

## 2018-07-03 RX ADMIN — IBUPROFEN PRN MG: 200 TABLET, FILM COATED ORAL at 11:41

## 2018-07-03 RX ADMIN — FOLIC ACID SCH MG: 1 TABLET ORAL at 08:48

## 2018-07-03 RX ADMIN — Medication SCH MG: at 18:57

## 2018-07-03 RX ADMIN — ASCORBIC ACID TAB 250 MG SCH MG: 250 TAB at 18:57

## 2018-07-03 RX ADMIN — OLANZAPINE SCH MG: 5 TABLET, ORALLY DISINTEGRATING ORAL at 21:04

## 2018-07-03 RX ADMIN — VITAMIN D, TAB 1000IU (100/BT) SCH UNITS: 25 TAB at 08:48

## 2018-07-03 NOTE — ASMTBHDC
Notes

 

Note:                         

Notes:

CC speaks to client briefly during check-in.  Client presents better than previous days.  Client 

presents as clean, slightly more organized, normal rate, tone of speech, etc.   Additionally CC 

completed family meeting with parents and provider yesterday regarding client's next treatment 

options.  Everyone, including client is excited for her to transfer to [facility] this Thursday July 5th, etc.  Client can be labile on the unit at times.***

 

Date Signed:  07/03/2018 02:05 PM

Electronically Signed By:Kj Bone

## 2018-07-03 NOTE — SOAPPROG
SOAP Progress Note


Assessment/Plan: 


Assessment:





Unspecified psychosis.  Slight improvement noted. (see subjective/objective note

).  Ongoing S/S psychosis, patient is not safe to discharge, and requires 

continued inpatient level of care.  Patient could benefit from continued 

inpatient hospitalization for crisis stabilization, safety, and medication 

evaluation.  





Plan:





Review psychotropic medication treatment informed consent and recommendations.  

After reviewing treatment options, risk and benefits of treatment, patient 

agrees to continue current medications and agrees to increase Zyprexa Zydis to 

20 mg po QHS.  No other medication changes at this time as more time is needed 

to determine ongoing tolerability and efficacy.  Plan is to continue to observe 

patient for response to treatment.  Next steps are for patient to meet with 

care manager to plan a safe discharge plan and establish outpatient services 

for ongoing treatment.  Consider discharge next Thursday if patient is in 

stable condition, safe, and has a safe discharge plan.   





PSYCHOTROPIC MEDICATION TREATMENT INFORMED CONSENT and RECOMMENDATIONS: Review 

nature of condition, diagnosis, and prognosis.  Review nature and purpose of 

psychotropic medication treatment.  Review type of psychotropic medications 

being ordered.  Review risk and benefits of psychotropic medication treatment.  

Review probable length of time patient will need to take medications.  Review 

risk and benefits of not undergoing psychotropic medication treatment.  Review 

alternative treatments to psychotropic medications.  Review psychotropic 

medications contraindications, drug-drug interactions, side effects, and 

importance of reporting any side effects to a psychiatric provider or nurse 

during inpatient hospitalization, and upon discharge to patients psychiatric 

outpatient provider, primary care provider, or other health care professional.  

Review importance of asking a nurse, psychiatric provider, or primary care 

provider any questions or problems concerning the psychotropic medications.  

Verify patient understands the information that has been provided, and 

understands, accepts, and agrees to psychotropic medications.  





Review patients safety plan and importance of patient to report to staff while 

hospitalized if patient is ever a danger to self/others, or unable to care for 

self, and upon discharge, the importance for patient to contact [facility] or 911, or go to the nearest emergency room, if patient is ever a 

danger to self/others, or unable to care for self.  Recommend that upon 

discharge patient establish medication management treatment with a psychiatric 

provider, establishes routine therapy appointments, and follow-up with primary 

care provider.  Verify patient understands and agrees to these recommendations.








07/03/18 10:53





Subjective: 


Following up with patient for evaluation of psychosis and safety.  Patient 

reports, "Feel like ability to cook (at [facility]) really will help my 

soul."   





Objective: 





 Vital Signs











Temp Pulse Resp BP Pulse Ox


 


 36.6 C   106 H  14   118/56 L  96 


 


 06/25/18 06:00  06/27/18 09:34  06/27/18 09:34  06/27/18 09:34  06/27/18 09:34








Treatment team report: Consulted with treatment team staff for update on 

patients progress in treatment.  Nurses report patient continues to Zyprexa 

Zydis 15 mg po QHS as prescribed.  Nurses report patient continues to show no 

insight and poor judgement.  Staff reports patient slept 8.5 hours last night.  

Patient is eating all meals.  Patient denies SI/HI, A/V hallucinations, 

delusions, and has expressed no psychiatric symptoms since her admission.  

Staff reports patient has improved since her admission noting: medication 

adherence, improved sleep, less tangential and hyperactive.  Continued 

disorganized and delusional.  





Patient has agreed to continue treatment at [facility] 

after discharge.





The patient is a well-nourished, well-developed, female, looking older than 

chronological age.  Attire is appropriate, hospital garb, and is neat and 

clean.  Grooming status is appropriate.  Ambulation is independent.  Gait is 

normal and coordinated.  Posture is normal.  Eye contact is appropriate.  Motor 

activity is appropriate and coordinated.  Attitude is cooperative and friendly.

  Patient relates well to this interviewer.  R/R/V normal.  Patient reports 

mood as going well with euphoric affect.  Patients thought process is non-

linear and illogical, with loose associations.  Patient no longer presents 

tangential and hypertalkative.  Patient does not report suicidal/homicidal 

thoughts, ideas, or plans.  Patient denies auditory, visual hallucinations.  

Patient denies delusions; however, has reported several delusions to this 

interviewer and staff.  Patient does not appear to be attending to internal 

stimuli.  Patient is oriented to person, place, time, and absent to situation.  

Attention and concentration are poor.  Insight is poor.  Judgment is impaired.  

Patient does not report undesirable side effects from the medications.








COGNITIVE FUNCTION:


Retention / Recall:  repeat back these numbers:    5 1 5 0 3     /    9 6 8 3 6 

4 2   - without difficulty   


Abstractions: What does the statement Rolling stones gather no blanchard?  patient 

answers, "friction is basically taking all the blanchard as they roll, like a Brillo 

pad, taking off all the blanchard"


                       How is an airplane similar to a bird?  patient answers, 

"depends on what type of airplane, some airplanes have two wings and some have 

more than two wings, they both have a body as such"


Memory: Remember these 3 items, ask to repeat back:  Pin, Car, Duck.  - without 

difficulty 


Judgement:  If you were in a restaurant and heard a fire alarm go off, what 

would you do?  patient answers, "tend to have cat like relexes, and the one 

time I was in a fire I tend to jump in high-alert mode, I would be the fire-

fighter in that role"


Orientation: x3, no insight to current situation


Calculations:  3 x 7     Count backwards by 3 or 7 starting from 100  - without 

difficulty 


Knowledge:  Current president of the [place]?  Before that?  - without difficulty

















- Time Spent With Patient


Time Spent With Patient: 


30 minutes, met with patient individually.








- Pending Discharge


Pending Discharge Within 24 Hours: No


Pending Discharge Within 48 Hours: Yes


Pending Discharge Date: 07/05/18


Pending Discharge Time: 11:00





ICD10 Worksheet


Patient Problems: 


 Problems











Problem Status Onset


 


Psychosis Acute

## 2018-07-04 RX ADMIN — CETIRIZINE HYDROCHLORIDE SCH MG: 10 TABLET, FILM COATED ORAL at 08:47

## 2018-07-04 RX ADMIN — ASCORBIC ACID TAB 250 MG SCH MG: 250 TAB at 20:49

## 2018-07-04 RX ADMIN — VITAMIN D, TAB 1000IU (100/BT) SCH UNITS: 25 TAB at 08:47

## 2018-07-04 RX ADMIN — Medication PRN MG: at 20:58

## 2018-07-04 RX ADMIN — MAGNESIUM HYDROXIDE PRN ML: 400 SUSPENSION ORAL at 20:59

## 2018-07-04 RX ADMIN — Medication SCH MG: at 20:53

## 2018-07-04 RX ADMIN — OLANZAPINE SCH MG: 5 TABLET, ORALLY DISINTEGRATING ORAL at 20:51

## 2018-07-04 RX ADMIN — FOLIC ACID SCH MG: 1 TABLET ORAL at 08:47

## 2018-07-04 RX ADMIN — Medication SCH MG: at 08:47

## 2018-07-04 RX ADMIN — VITAMIN D, TAB 1000IU (100/BT) SCH UNITS: 25 TAB at 20:49

## 2018-07-04 RX ADMIN — ASCORBIC ACID TAB 250 MG SCH MG: 250 TAB at 08:47

## 2018-07-04 RX ADMIN — IBUPROFEN PRN MG: 200 TABLET, FILM COATED ORAL at 08:51

## 2018-07-04 NOTE — SOAPPROG
SOAP Progress Note


Assessment/Plan: 


Assessment:





Unspecified psychosis.  Slight improvement noted. (see subjective/objective note

).  Ongoing S/S psychosis, patient is not safe to discharge, and requires 

continued inpatient level of care.  Patient could benefit from continued 

inpatient hospitalization for crisis stabilization, safety, and medication 

evaluation.  





Plan:





Review psychotropic medication treatment informed consent and recommendations.  

After reviewing treatment options, risk and benefits of treatment.  No 

medication changes at this time as more time is needed to determine ongoing 

tolerability and efficacy.  Plan is to continue to observe patient for response 

to treatment.  Next steps are for patient to meet with care manager to plan a 

safe discharge plan and establish outpatient services for ongoing treatment.  

Consider discharge next Thursday if patient is in stable condition, safe, and 

has a safe discharge plan.   





PSYCHOTROPIC MEDICATION TREATMENT INFORMED CONSENT and RECOMMENDATIONS: Review 

nature of condition, diagnosis, and prognosis.  Review nature and purpose of 

psychotropic medication treatment.  Review type of psychotropic medications 

being ordered.  Review risk and benefits of psychotropic medication treatment.  

Review probable length of time patient will need to take medications.  Review 

risk and benefits of not undergoing psychotropic medication treatment.  Review 

alternative treatments to psychotropic medications.  Review psychotropic 

medications contraindications, drug-drug interactions, side effects, and 

importance of reporting any side effects to a psychiatric provider or nurse 

during inpatient hospitalization, and upon discharge to patients psychiatric 

outpatient provider, primary care provider, or other health care professional.  

Review importance of asking a nurse, psychiatric provider, or primary care 

provider any questions or problems concerning the psychotropic medications.  

Verify patient understands the information that has been provided, and 

understands, accepts, and agrees to psychotropic medications.  





Review patients safety plan and importance of patient to report to staff while 

hospitalized if patient is ever a danger to self/others, or unable to care for 

self, and upon discharge, the importance for patient to contact [facility] or [facility], or go to the nearest emergency room, if patient is ever a 

danger to self/others, or unable to care for self.  Recommend that upon 

discharge patient establish medication management treatment with a psychiatric 

provider, establishes routine therapy appointments, and follow-up with primary 

care provider.  Verify patient understands and agrees to these recommendations.





07/04/18 08:20





Subjective: 


Following up with patient for evaluation of psychosis and safety.  Patient 

reports, "Tegretol seemed pretty docile, so I liked that, it was smooth, 

whatever it did."   





Objective: 





 Vital Signs











Temp Pulse Resp BP Pulse Ox


 


 36.6 C   67   14   112/62   94 


 


 07/04/18 06:00  07/04/18 06:00  07/04/18 06:00  07/04/18 06:00  07/04/18 06:00








Treatment team report: Consulted with treatment team staff for update on 

patients progress in treatment.  Nurses report patient continues to Zyprexa 

Zydis 20 mg po QHS as prescribed.  Nurses report patient continues to show no 

insight and poor judgement.  Staff reports patient slept 8.5 hours last night.  

Patient is eating all meals.  Patient denies SI/HI, A/V hallucinations, 

delusions, and has expressed no psychiatric symptoms since her admission.  

Staff reports patient has improved since her admission noting: medication 

adherence, improved sleep, less tangential and hyperactive.  Continued 

disorganized and delusional.  





CASE MANAGEMENT: Patient has agreed to continue treatment at [facility] after discharge.  Dr. Khan to evaluate patient tomorrow at 0930.





The patient is a well-nourished, well-developed, female, looking older than 

chronological age.  Attire is appropriate, hospital garb, and is neat and 

clean.  Grooming status is appropriate.  Ambulation is independent.  Gait is 

normal and coordinated.  Posture is normal.  Eye contact is appropriate.  Motor 

activity is appropriate and coordinated.  Attitude is cooperative and friendly.

  Patient relates well to this interviewer.  R/R/V normal.  Patient reports 

mood as good with euphoric affect.  Patients thought process is non-linear and 

illogical, with loose associations.  Patient no longer presents tangential and 

hypertalkative.  Patient does not report suicidal/homicidal thoughts, ideas, or 

plans.  Patient denies auditory, visual hallucinations.  Patient denies 

delusions; however, has reported several delusions to this interviewer and 

staff.  Patient does not appear to be attending to internal stimuli.  Patient 

is oriented to person, place, time, and absent to situation.  Attention and 

concentration are poor.  Insight is poor.  Judgment is impaired.  Patient does 

not report undesirable side effects from the medications.











- Time Spent With Patient


Time Spent With Patient: 


15 minutes, met with patient individually.








- Pending Discharge


Pending Discharge Within 24 Hours: Yes


Pending Discharge Within 48 Hours: No


Pending Discharge Date: 07/05/18


Pending Discharge Time: 11:00





ICD10 Worksheet


Patient Problems: 


 Problems











Problem Status Onset


 


Psychosis Acute

## 2018-07-04 NOTE — ASMTBHDC
Notes

 

Note:                         

Notes:

CC speaks to client briefly during check-in.  Client presents as brighter, more focused than 

previous days.  Client is willing to transition to [facility] tomorrow and is scheduled to be 

picked up at 9:30 am per [place] team.** Client is happier and brighter regarding her future.**







Follow up with:





Maddi Peterson/[address]

[phone]



Intake Appt:  Thursday July 5th  (07/05/18) at 9:30am***

 

Date Signed:  07/04/2018 10:38 AM

Electronically Signed By:Kj Bone

## 2018-07-05 VITALS — SYSTOLIC BLOOD PRESSURE: 112 MMHG | DIASTOLIC BLOOD PRESSURE: 63 MMHG

## 2018-07-05 RX ADMIN — VITAMIN D, TAB 1000IU (100/BT) SCH UNITS: 25 TAB at 08:48

## 2018-07-05 RX ADMIN — Medication SCH MG: at 08:48

## 2018-07-05 RX ADMIN — CETIRIZINE HYDROCHLORIDE SCH MG: 10 TABLET, FILM COATED ORAL at 08:48

## 2018-07-05 RX ADMIN — FOLIC ACID SCH MG: 1 TABLET ORAL at 08:47

## 2018-07-05 RX ADMIN — ASCORBIC ACID TAB 250 MG SCH MG: 250 TAB at 08:48

## 2018-07-05 NOTE — BDS
[f 
rep st]



                                                  BEHAVIORAL HEALTH DISCHARGE 
SUMMARY





REASON FOR ADMISSION:  Pertinent data from ED note dated 2018, the 
patient presented to the ED by [place] [employer] for court-ordered 
mental health evaluation.  She was accompanied by her father.  Father reported 
she has had risky behaviors, hearing things, talking to people who are 
.  The patient's father reported she has not been taking care of 
herself or seeking medical treatment for a pituitary tumor.  The patient denied 
suicidal ideation.  The patient reported in the ED, "I have been channeling 
people."  The patient did describe possible auditory hallucinations.  Did not 
describe visual hallucinations.  The patient was admitted involuntarily on an 
M1 hold due to being gravely disabled due to a mental illness.  The patient was 
admitted for safety crisis stabilization and medication evaluation.



ADMITTING DIAGNOSIS:  Psychosis.



ADMISSION PHYSICAL EXAM:  The patient was seen by Dr. Palacios on 06/15/2018, for 
an internal medicine consultation.  Reason for referral was medical clearance 
for inpatient Behavioral Health stay.  Dr. Palacios reported he saw no medical 
contraindications to the patient's continued stay on the inpatient behavioral 
health unit, or to any psychiatric medications or procedures.  For further 
details, please refer to Dr. Palacios's note.



ADMISSION LABS:  From the ER, CBC showed an elevated white blood cell count at 
10.56.  She has macrocytosis with an MCV of 101.8.  Mean cellular hemoglobin is 
also elevated at 34.2, and there is no left shift in the white blood cells.  
Serum chemistry shows normal renal function and electrolytes.  TSH was normal 
at 1.66.  Glucose was slightly elevated at 110, but this is likely not fasting.
  Pregnancy test negative.  Toxicology screen in the serum was negative for 
ethyl alcohol, and the urine is negative for substances of abuse.



HOSPITAL COURSE:  The most prominent symptoms and behaviors while the patient 
was hospitalized here were psychosis and manic-like symptoms.  Symptoms 
included delusions, patient attending to internal stimuli, auditory 
hallucinations, decreased need for sleep, tangential and disorganized thought 
process.  Treatment modalities utilized were milieu and group therapy.  Zyprexa 
was slowly titrated and Zyprexa Zydis 20 mg p.o. at bedtime was prescribed to 
target psychosis and jaisel symptoms.  Medication was tolerated with no report 
of side effects and with good response.  The patient has improved considerably 
at time of discharge.  The patient no longer presents with jasiel symptoms.  The 
patient has been sleeping well for several weeks, getting anywhere from 8-10 
hours of sleep per night.  The patient is no longer tangential.  The patient is 
more organized with improved linear thought and improved organized thought 
process.  The patient is less delusional, however, continues to show little 
insight into her current condition.  The patient reports that she has improved 
since admission.  The patient states she feels safe to discharge and contracts 
for safety.  The patient's response to treatment thus far has been fair.  There 
were no adverse or unexpected results of treatment.  The patient was safe 
throughout her stay, active in her treatment, engaged in groups, and was 
appropriate with staff.  The treatment team consensus that the patient is in 
stable condition and is safe to discharge.



CONDITION AT DISCHARGE:  The patient is in stable condition, and is no longer a 
danger to herself or others, and is not gravely disabled due to her mental 
illness.  The patient is no longer in need of inpatient level of care, and can 
safely and effectively be treated in the community and at a lower level of 
care.  The patient's level of risk at time of discharge is low.  The patient 
will be admitted at [facility] for further treatment.  MENTAL STATUS EXAM
:  The patient is a well-nourished, well-developed female looking stated 
chronological age.  The patient's attire is appropriate.  Dress is casual, neat
, and clean.  The patient's grooming status is appropriate and clean.  
Ambulation is independent.  Gait is normal and coordinated.  Posture is normal 
and relaxed.  Eye contact is appropriate and adequate.  Motor activity is 
appropriate with purposeful, organized, coordinated movements with no 
involuntary movements noted.  The patient's attitude is cooperative and 
friendly.  The patient appears attentive and relates well to this interviewer.  
Language production is spontaneous.  Rate, rhythm, and volume are normal.  The 
patient reports mood as "great," with adequately arranged and congruent affect.
  The patient's thought process is more linear and more logical compared to her 
time at admission.  The patient still has some loose associations, no longer 
has tangential thought.  No thought blocking noted.  The patient does present 
with concrete thinking, and is illogical at times during the discharge 
interview.  The patient though is goal-directed, as she agrees with [facility] being her next best step in her treatment.  The patient does not 
report suicidal, homicidal thoughts, ideas, or plans.  The patient denies 
auditory or visual hallucinations.  Patient denies delusions.  Patient does at 
times appear to be attending to internal stimuli.  Orientation is full to person
, full to place, full to time, and absent to situation.  The patient's 
attention and concentration are adequate.  The patient's insight and judgment 
are poor.  No evidence of gross cognitive dysfunction at any point during the 
interview, and no evidence of apparent dysfunction, recent remote memory noted.
  The patient does not report undesirable side effects from medications.  
Patient has been sleeping an average of 8 hours per night for the last several 
weeks.  The patient's appetite has been good, and she is eating all meals.



DISCHARGE DIAGNOSIS:  Unspecified psychosis.



DISCHARGE MEDICATIONS:  Zyprexa 20 mg p.o. at bedtime, a 30-day prescription 
provided at discharge; Tegretol 200 mg p.o. twice daily, 30-day prescription 
provided at discharge.  Prescriptions were reviewed with the patient at time of 
discharge for accuracy.



DISPOSITION:  The patient left the hospital, and will be transferred to a lower 
level of care at [facility].  Staff from [facility] were here 
today to do an intake evaluation, and patient has been accepted for treatment 
at [facility] and will go going there today after discharge.



FOLLOWUP: Care coordinator reports the appropriate outpatient follow-up 
services have been established and outpatient appointments have been scheduled.
  The patient received written instructions with times and dates of outpatient 
follow-up appointments.  The following follow-up recommendations were provided 
to the patient at discharge: Continue psychotropic medications as prescribed 
and attend appointments as scheduled.  Report any side effects to a psychiatric 
outpatient provider, a primary care provider, or other health care 
professional.  Address any questions or problems concerning the psychotropic 
medications with a psychiatric outpatient provider, a primary care provider, or 
other health care professional.  Contact [facility] or [facility], or go 
to the nearest emergency room, if you are ever a danger to yourself/others, or 
unable to care for yourself.  As soon as possible, establish a routine 
medication management treatment with a psychiatric provider, establish routine 
therapy appointments, and follow-up with a primary care provider.



LEGAL COURSE:  The patient was admitted involuntary on an M1 hold.  During the 
course of the hospitalization, patient was placed on a short-term certification
, and was also placed on court-ordered medications.  Short-term certification 
and court-ordered medications will continue at [facility].  



ATTITUDE AT TIME OF DISCHARGE: The patient's attitude was positive at time of 
discharge, and the patient reports looking forward to discharging today to 
[facility].  The patient reports she feels safe to discharge.  She is no 
longer a danger to herself or others.  Reports she has improved since admission 
and contracts for safety.  The patient states she will continue medications as 
prescribed, and establish medication management treatment at [facility].
  The patient reports she understands the information that has been provided to 
her, and she understands, accepts, and agrees to psychotropic medications.  The 
patient reports internal protective factors as coping skills she has learned 
while hospitalized here, and she plans to continue to practice these coping 
skills after discharge.  The patient reports external protective factors as 
family and friends.  The patient describes looking forward to being outside and 
having more independence after discharge at [facility].  The patient 
describes future plans as continuing her treatment at [facility].  The 
patient reports she has completed a wellness plan, and has reviewed wellness 
plan with her nurse.  The patient's parents agree with patient discharging and 
being transferred to [facility], and they report patient has a safe 
discharge plan and is safe to discharge today.



LABS AND STUDIES:  There were no pending labs or studies at time of discharge.



ADVANCED DIRECTIVES:  There are no advanced directives on file, and patient was 
full code during hospitalization.





The following psychotropic medication treatment informed consent and 
recommendations were provided to the patient at time of discharge.  Patient 
reports she understands, accepts, and agrees to the information that has been 
provided.   



PSYCHOTROPIC MEDICATION TREATMENT INFORMED CONSENT and RECOMMENDATIONS: Review 
nature of condition, diagnosis, and prognosis.  Review nature and purpose of 
psychotropic medication treatment. Review type of psychotropic medications 
being prescribed.  Review risk and benefits of psychotropic medication 
treatment.  Review probable length of time will need to take medications.  
Review risk and benefits of not undergoing psychotropic medication treatment.  
Review alternative treatments to psychotropic medications.  Review psychotropic 
medications contraindications, side effects, and importance of reporting any 
side effects to a psychiatric provider, primary care provider, or other health 
care professional.  Review importance of her asking a psychiatric provider or 
primary care provider any questions or problems concerning the psychotropic 
medications.  Review importance of reporting to a psychiatric provider, primary 
care provider, or other health care professional if she plans to or becomes 
pregnant.  



Review safety plan and the importance to contact [facility] or 911
, or go to the nearest emergency room, if ever a danger to yourself/others, or 
unable to care for yourself.  Recommend upon discharge to establish routine 
medication management treatment with a psychiatric provider, establish routine 
therapy appointments, and follow-up with a primary care provider.  Verify 
patient understands, accepts, and agrees to the information that has been 
provided. 



Job #:  [id number]

MTDD

## 2024-11-07 NOTE — ASDISCHSUM
----------------------------------------------

Discharge Information

----------------------------------------------

Plan Status:Psych Placement/Petitioned               Medically Cleared to Leave:2018

Discharge Date:2018 02:30 PM                   CM D/C Disposition:Other (Not listed)

ADT D/C Disposition:Other Psych, Not [facility]ammie        Projected Discharge Date:2018 12:00 PM

Transportation at D/C:Family                         Discharge Delay Reason:

Follow-Up Date:2018                            Discharge Slot:

Final Diagnosis:

----------------------------------------------

Placement Information

----------------------------------------------

Referral Type:Psychiatric Hospital or Unit           Referral ID:[id number]

Provider Name:

Address 1:                                           Phone Number:

Address 2:                                           Fax Number:

City:                                                Selection Factors:

State:

 

Referral Type:Outpatient Center/Clinic               Referral ID:[id number]

Provider Name:

Address 1:                                           Phone Number:

Address 2:                                           Fax Number:

City:                                                Selection Factors:

State:

 

----------------------------------------------

Patient Contact Information

----------------------------------------------

Contact Name:THERESA                             Relationship:Mother

Address:                                             Home [phone]

                                                     Work Phone:

City:                                                Alternate Phone:

[facility] Code:                                      Email:

----------------------------------------------

Financial Information

----------------------------------------------

Financial Class:HMO and PPO Plans

Primary Plan Desc:[facility]      Primary Plan Number:[id number]

Secondary Plan Desc:                                 Secondary Plan Number:

 

 

----------------------------------------------

Assessment Information

----------------------------------------------

----------------------------------------------

TLC Evaluation

----------------------------------------------

TLC Evaluation - Basic Information

 

Evaluation Start Date and     2018 03:30 PM

Time                          

Hospital Status               Answers:  M1 Hold                               

72-hr M1 Hold Start Date      2018 05:35 PM

and Time                      

Patient statement             

Notes:

"The court ordered evaluation brought me in."

Narrative                     

Notes:

Pt is a 30 year old female who was brought to [facility] ed on a court ordered evaluation.The petitioner 

was her father El.  Both [place] and [facility] were in the emergency department with pt. Per 

parents, they started noticing changes in her behavior in 2018. In 2018, pt was 

brought to [facility] ed for dehydration and as the visit went on, pt became frustrated and left the 

hospital with an IV in her arm. Pt was returned by [facility] and was placed on a M1 hold and evaluated by 


TLC. Per FOC, pt has always been a very health conscious person and recently pt was dx with a 

pituitary tumor and is refusing the doctor's advice of surgery, even though the doctor informed her 


that this decision  would cut ten years off her life expectancy.  FOC stated this decision was very 


unlike her and seems like an irrational decision In early April, FOC stated he became aware that pt 


started hearing voices.  Pt confided on a car ride to [place] that she was channeling  

relatives that she was working with the  of Defense to disarm nuclear weapons.  FOC 

stated, "Her mother and I also have witnessed her talking in deeper voices and in third person 

stating "Xiao needs to do this."  Most recently, pt believes she is in a relationship with a 

former co-worker, a doctor named Gerry. POC stated Gerry remembers pt from working with her years 

ago, but are not friends and have not been in contact over the years.  Per POC, pt believes they 

are "soul mates."POC stated that pt has been texting him constantly and a couple of weeks ago she 

showed up at a conference Gerry was in. Per POC, Gerry was scared and so was his family and Gerry 

notified the police. A couple of weeks ago, pt drove up to [place] to "meet Gerry for 

dinner." POC stated pt stayed a week waiting to meet him. POC stated pt believes that Gerry has been 

"hypnotized and that's why he isn't with her right now."  Per court ordered evaluation, "pt has 

been making choices that are dangerous, like driving in snowstorms, swimming in freezing cold lakes 


and back country skiing in the mountains at night

Diagnosis History             

Notes:

Pt denies any prior dx.

Prior suicide attempts        

Notes:

Pt denied any prior suicide attempts.  Per TLC eval on 18, "pt denied any past history of 

suicide attempts. pt reported having made a few superficial marks to her left wrist 5 days ago, but 


was not a suicide attempt, per pt. pt stated i was just feeling anxious and couldnt sleep. 

Prior hospitalizations        

Notes:

Pt denied any prior hospitalizations. 

Treatment Responses           

Notes:

N/A

History of violence           

Notes:

Pt denied any hx of violence.

Therapist:                    ACE WHATLEY

Psychiatrist:                 None

Medications                   

(name, dosage, route, freq    

uency)                        

Notes:

Pt does not take any prescribed medications but reported that she takes a supplement that she 

orders from [facility] called, Artensunate with Bicarbonate.  Pt stated it is an injection she gives 


herself.  When asked why does she take this, pt stated, I take it for anti-allergy stuff, recently 

I got a lot of mosquito bites on my feet so it helps with that."  Per parents, a few months ago, pt 


was taking "50-60 different supplements."

Allergies/Reaction            

Notes:

NKA

Sleep                         

Notes:

Pt stated she sleeps "much better."

Appetite                      

Notes:

WNL

Medical/Surgical history      

Notes:

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 Pituitary Tumor. 

Substance use history         

(frequency, intensity, his    

tory, duration)               

Notes:

Pt stated, " I recently started drinking again which is good. I wasn't drinking for 1.5 

years.  It's nice to relax.  Pt stated she drinks about 5x a week. Pt stated she also uses cannabis 


oil.  When asked how often she uses, pt stated, " I'm not really good at remembering, maybe 4-5 

times a week."  Per FOC, pt never would use drugs or alcohol in the past and this appears to be a 

new behavior. Utox was negative for all substances. Bal was.0.

Family composition            

Notes:

Prents remain . pt has two brothers, ages 40 and 32. parents, boyfriend, and 

sister-in-law, appeared highly concerned and enmeshed/overinvolved in their concerns for pt.

Need for family               Answers:  No                                    

participation in                                                              

patient's care                                                                

Family                        

psychiatric/substance         

abuse history                 

Notes:

.P denied knowledge of any family history of mental health or substance abuse issues.

Developmental history         

Notes:

Pt was born and raised in [place]. she described her childhood as a normal [place] family. she 


appeared to have achieved normal childhood developmental milestones. she denied any childhood 

history of tbis, loc or concussions. she denied any childhood history of physical, emotional or 

sexual abuse/trauma.

Abuse concerns                Answers:  None                                  

Marital status/children       

Notes:

Pt is not  but stated, " I have a soul mate. I was aware of him 8-9 years ago but I think he 


was looking for me longer.  We are meant to be together." Pt stated her "soul mate" is Gerry [place] 

and is a surgeon.  Per court ordered eval, this is not a real relationship and is part pt's 

delusions. 

Living situation              

Notes:

Pt lives in [place]. 

Sexual                        

history/orientation           

Notes:

Heterosexual.

Peer support/family           

strengths                     

Notes:

Pt reported that her family is supportive. Pt stated she has friends but, "it's been tough felecia I 

haven't felt that well lately."  Pt stated she has lost touch with some of her friends. 

Education level/history       

Notes:

Pt has a MA degree in Biomedical Engineering. 

Work history                  

Notes:

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 she reported being 


on medical leave since 2017.  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 

                      

Notes:

None

Legal                         

Notes:

None

Latter-day/Spiritual           

Notes:

None that would intefere with tx.

Leisure                       

Notes:

Pt stated she enjoys being outside, swimming and skiing. 

Collateral                    

Notes:

Parents- Tawanda and El

[facility] Evaluation - Mental Status Exam

 

Appearance:                   Answers:  Appropriate                           

Eye Contact:                  Answers:  Intermittent                          

Mood:                         Answers:  Euthymic                              

Affect:                       Answers:  Calm                                  

                                        Guarded                               

Behavior:                     Answers:  Cooperative                           

                                        Guarded                               

                                        Resistive to Care                     

                                        Restless                              

Speech:                       Answers:  Clear                                 

                                        Mumbling                              

                                        Perseverating                         

Thought Process:              Answers:  Distracted                            

Insight:                      Answers:  Poor                                  

Judgement:                    Answers:  Poor                                  

Hallucinations:               Answers:  Auditory                              

Delusions:                    Answers:  Erotic/Stalking                       

                                        Paranoid Ideation                     

Pt reported to have           Answers:  No                                    

suicidal/self-injuring                                                        

ideation/behavior?                                                            

Pt reported to be making      Answers:  No                                    

suicidal/self-injuring                                                        

threats?                                                                      

Pt reported to have           Answers:  No                                    

aggression/assault                                                            

ideation/behavior?                                                            

Pt reported to be making      Answers:  No                                    

aggression/assault                                                            

threats?                                                                      

Pt exhibits inability to      Answers:  Yes                                   

care for self/grave                                                           

disability?                                                                   

Ideation/behavior is          Answers:  Yes                                   

chronic?                                                                      

Patient has a specific        Answers:  No                                    

plan?                                                                         

History of                    Answers:  Yes                                   

suicidal/self-injuring                                                        

ideation, behavior, or                                                        

threats?                                                                      

History of                    Answers:  No                                    

aggressive/assaultive                                                         

ideation, behavior, or                                                        

threats?                                                                      

History of serious            Answers:  No                                    

physical harm to                                                              

self/others while in                                                          

treatment setting?                                                            

TLC Evaluation - Suicide/Homicide Risk

 

Suicide Risk Factors:         Answers:  < 20 or > 40 Years of Age             

                                        Command Hallucinations                

                                        Psychotic Disorder                    

                                        Self-Harm Behaviors                   

Homicide/violence risk        Answers:  None                                  

factors:                                                                      

Current Suicidal              Answers:  No                                    

Ideation?                                                                     

Current Suicidal Ideation     Answers:  No                                    

in the Past 48 Hours?                                                         

Current Suicidal Ideation     Answers:  No                                    

in the Past Month?                                                            

Current Suicidal              Answers:  No                                    

Ideation, Worst Ever?                                                         

Suicide Internal              Answers:  Other                         Notes:  Spirtual beliefs and 

Protective Factors:                                                           stated, " I want to foc
us 

                                                                              on how I can get myself
 

                                                                              healthier."

Ranking of patient's          Answers:  Moderate                              

suicidal risk:                                                                

Ranking of patient's          Answers:  Low                                   

homicidal risk:                                                               

TLC Evaluation - Wrap-up

 

AXIS I Diagnosis (include     

DSM-V and ICD-10              

codes), must also be          

entered in                    

Paybook, which is the        

source of truth.              

Notes:

DELUSIONAL DISORDER (Specifier)   297.1 (F22)

Evaluation End Date and       2018 07:25 PM

Time (HH:MM):                 

 

Date Signed:  2018 07:31 PM

Electronically Signed By:Pam Hernandez

 

 

----------------------------------------------

TLC Discharge Disposition

----------------------------------------------

TLC Discharge Disposition

 

Disposition:                  Answers:  Admit                                 

Discharge                     

Concerns/Recommendations:     

Notes:

IN CONSULTATION WITH [facility] ED PHYSICIAN, KRISTOFER GARZA MD AND ON-CALL PSYCHIATRIST, QUINCY MARIE MD, BOTH CONCURRED THAT PT APPEARS TO MEET 27-65 CRITERIA REQUIRING PSYCHIATRIC 

HOSPITALIZATION AS PT APPEARS TO BE GRAVELY DISABLED DUE TO A MENTAL ILLNESS CONDITION. PT WAS 

GIVEN THE 3N PROHIBITED BELONGINGS LIST WHILE IN THE ED.

Was patient given the         Answers:  Yes                                   

Inpatient Behavioral                                                          

Health Prohibited                                                             

Belongings List while in                                                      

the ED?                                                                       

For inpatient                 Quincy Marie MD

admission, the following      

psychiatrist agreed to        

accept patient for            

admission to Behavioral Health ([facility]):              

Type of Hold:                 Answers:  M1/72-hour Hold                       

Hold initiated by:            Answers:  ED Physician                          

 

Date Signed:  2018 07:35 PM

Electronically Signed By:Pam Hernandez

 

 

----------------------------------------------

TLC Progress Note

----------------------------------------------

Notes

 

Note:                         

Notes:

Pt asked to speak to me regarding her court ordered evaluation and asked why she was being admitted 


again.  Pt stated when she was here at [facility] in February she completed the BDI/BSS assessment and 

Prashanth explained everything to her, but did not feel like this writer explained everything to 

her. This writer offerred the BDI/BSS assessment and pt did not want to complete.  This writer also 


explained that I would be consulting with our on-call psychiatrist who makes the final decision on 

whether or not she can be discharged or will need to be admitted. This writer also explained the 

criterua for admission into a hospital which she stated she understood before we started the 

evaluation. Pt asked for my first and last name. This writer declined to give pt  last name and 

explained it's is against policy. 

 

Date Signed:  2018 08:34 PM

Electronically Signed By:Pam Hernandez

 

 

----------------------------------------------

TLC Progress Note

----------------------------------------------

Notes

 

Note:                         

Notes:

This writer provided POC  with information to the [facility] as an additional 

resource.

 

Date Signed:  2018 09:03 PM

Electronically Signed By:Pam Hernandez

 

 

----------------------------------------------

Behavioral Health Master Treatment Plan

----------------------------------------------

 Master Treatment Plan

 

Master Treatment Plan         Answers:  Impaired Reality                      

for:                                                                          

Date:                         2018

Diagnosis on Admission:       Delusional Disorder

Expected length of stay:      3-5 days

Reason for admission:         

Notes:

Patient is a 30 yoa female, who was brought to ED for a court ordered evaluation The petitioner was 


her father El.*  Client denies any S/I or H/I symptoms. Pt is not on any medications at this 

time; however, does take "supplements" that she orders from [facility] (unknown).  Stated in TLC 

report that "DANNIE has witnessed her talking in deep voices  and in the 'third person,' stating 

Yang needs to do this." 

Patient's stated              

presenting problems:          

Notes:

Parents want me "to do western medication protocol." 

Patient's goals for           

treatment:                    

Notes:

For Doctor German to work to get me out of here.

Patient's strengths:          

Notes:

I listen well, I stay positive, focus and I like to learn new things.

Identify supports outside     

of hospital:                  

Notes:

"I have a lot of friends." 

Discharge criteria:           

Notes:

Psychotic Symptoms will be reduced or eliminated with return to baseline functioning in 

affect, thinking and behavior prior to discharge.**

Initial disposition           

plan/considerations:          

Notes:

Continue to get better and return to my house.**

Master Treatment Plan Required Signatures

 

Psychiatrist signature:       Answers:  SOLOMON SebastianP: __________________________

RN on-shift signature:        Answers:  RN: ____________________________________

Patient signature:            Answers:  Patient: ____________________________________

 

Date Signed:  06/15/2018 12:51 PM

Electronically Signed By:Kj Bone

 

 

----------------------------------------------

Behavioral Health Discharge Planning Note

----------------------------------------------

Notes

 

Note:                         

Notes:

Patient's parents visited today at the lunch hour.  Patient woudl not sign a Release of [employer] 


to speak with her parents, but she was fine with them being in the room when we met.  Patient 

continues to have delusions and has been call her family, ranting and raging as well as talking in 

her intrusive voices.  Patient has a restraining order against a doctor she has been stalking in 

[place].  Patient has been calling the police and is currently on a phone restriction.  Patients 

medical records are in her chart and she does have a small brain tumor.  Patient is taking notes 

and correcting her parents about facts.  Patient continues to be delusional.  Patient did sign a 

Release of information for her doctors.  Client has a therapist, psychologist Ace Whatley.  Patient 


call the police to tell them that she was being forced to take [employer] [facility].  Patient tried 

to [place] from the unit this morning.  Patient also refused her blood draw this morning.  Patient 

slept 5.5 hours last night.  Her hold is up 2018 at 17:35.  Patient is aware of this and has 

been informed that she will be placed on a Short Term Certification.

 

Date Signed:  2018 04:11 PM

Electronically Signed By:Demi Jimenez

 

 

----------------------------------------------

Behavioral Health Discharge Planning Note

----------------------------------------------

Notes

 

Note:                         

Notes:

Patient is up on the milieu going to groups and socializing with others.  Patient's hold is up 

tomorrow.  She slept 4 hours last night.  Patient is restricted on the phone, staff needs to dial 

for her and she must stay near the nursing station and patient still called the police.  Police 

called the unit and said that patient wants an update on the restraing order.  Patient's parents 

came to visit her at the lunch hour and they felt she was better today.  

 

Date Signed:  2018 02:25 PM

Electronically Signed By:Demi Jimenez

 

 

----------------------------------------------

Behavioral Health Discharge Planning Note

----------------------------------------------

Notes

 

Note:                         

Notes:

The patient requested that her outpatient providers be included in her inpatient care. She 

specifically mentioned, Dr. Porter, [facility] Endocrinologist ([phone]), Ace Whatley, Therapist 

([phone]),  Dr. Bowling, [facility] Endocrinologist ([phone]). CC left VMs for both 

Dr. Porter and Dr. Bowling with necessary call back information, including the doctor and nurse 

practitioner. Florinda, from Dr. Suh's office reported that the patient has an upcoming 

appointment on  @ 14:30. 

 

Date Signed:  2018 05:22 PM

Electronically Signed By:Preethi Fritz

 

 

----------------------------------------------

Behavioral Health Family Meeting Note

----------------------------------------------

Notes

 

Note:                         

Notes:

CC participated in a family meeting with EAN Schmid, the parent's of the patient, and the 

patient. We discussed the treatment plan; including her OP providers, legal status, and goals for 

discharge which included medication compliance and stabilization. The patient continued to refused 

medication and demonstrated resistance to treatment. 

 

Date Signed:  2018 05:40 PM

Electronically Signed By:Preethi Fritz

 

 

----------------------------------------------

[facility] CM Progress Note

----------------------------------------------

CM Note

 

CM Note                       

Notes:

Client remains needy towards this CC and tries to monopolize her time spend with CC.  Pt remains on 


the unit talkaive; however, still delusional regarding certain thoughts.  Not taking meds, etc.  CC 


will follow up with client tomorrow.**

 

Date Signed:  2018 02:32 PM

Electronically Signed By:Kj Bone

 

 

----------------------------------------------

Behavioral Health Discharge Planning Note

----------------------------------------------

Notes

 

Note:                         

Notes:

Pt. reports feeling "good". Pt. stated she is sleeping a lot, which is good for her. Pt. stated her 


"body is figuring out" her new medication, adding she believes she will "need to take for a little 

bit". At this point in conversation, pt. began speaking in the third person. Pt. denied SI, HI, and 


paranoia. Pt. stated she experiences AH through "channeling". Pt. reports her "trusted sources" are 


her "soulmate, maker, and internal master healer". Pt. stated "we want to make sure this brain get 

healed" and pointed to her head. Pt. stated through channeling angels and her trusted sources, she 

has been working to save the world, and shared with CC the world will not be ending. Pt. stated "I 

am God". Pt. stated this CC's reward for helping pt. to leave this weekend, will be getting to meet 


her soulmate. Pt. stated "you are a big part of saving the world" to CC. 



Pt. presents as delusional, grandiose, with good eye contact and lacking insight. Pt. is medication 


compliant and slept 6.5 hours. 

 

Date Signed:  2018 02:02 PM

Electronically Signed By:Florinda Liang

 

 

----------------------------------------------

Behavioral Health Discharge Planning Note

----------------------------------------------

Notes

 

Note:                         

Notes:

Pt. reports feeling "good". Pt. stated the medication are "still moving stuff [phlegm] out of my 

brain". Pt. stated she slept "pretty good". Pt. stated "I know you guys are fighting against his 

[MD] habits". Pt. explained about how she knows the MD doesn't usually discharge on the 

weekends. Pt. stated discharging her today "makes the most sense". Pt. denied SI, HI, VH, and 

paranoia. Pt. stated she experiences AH through "channeling" and calls it "my 

direction". Pt. stated the medication is helping, adding "It's quieting down, it's 

calm". Pt. stated "there were a lot of entities trying to direct me". 



Pt. approached CC later asking if she can give staff her housing information as she will be 

traveling, and wants to offer her place to peer pts in need of housing. Pt. stated she plans to 

travel to [facility] with her soulmate, Gerry, although she doesn't know where he is right now. 

 

Date Signed:  2018 02:20 PM

Electronically Signed By:Florinda Liang

 

 

----------------------------------------------

Behavioral Health Discharge Planning Note

----------------------------------------------

Notes

 

Note:                         

Notes:

CC was able to confirm tentative discharge intake appt with [facility] for:











Follow up with:







[facility]



[facility] [address] , [address]



[facility] [address] [phone]







Intake Appt: , (18) @ 10:45 am**

 

Date Signed:  2018 09:00 AM

Electronically Signed By:Kj Bone

 

 

----------------------------------------------

Behavioral Health Discharge Planning Note

----------------------------------------------

Notes

 

Note:                         

Notes:

CC speaks to client briefly today during check-in.  Client appears to be tearful, unclean with 

pressured speech.  Client appears confused still disorganized, pleasant but unstable.  Client going 


to court later today for COM appearance.  It was suggested by treatment team that client could be 

responding to internal stimuli, etc.  Will know more after court appearance later today or in 

rounds tomorrow per doctor.**

 

Date Signed:  2018 11:59 AM

Electronically Signed By:Kj Bone

 

 

----------------------------------------------

Behavioral Health Discharge Planning Note

----------------------------------------------

Notes

 

Note:                         

Notes:

CC speaks to client after Dr. Willett from [facility] speaks to client.  Per 

client, [facility], etc.  Per Doctor, client could be 


admitted as soon as Thursday of next week (18).  CC provided information to [place] team.**

 

Date Signed:  2018 01:59 PM

Electronically Signed By:Kj Bone

 

 

----------------------------------------------

Behavioral Health Discharge Planning Note

----------------------------------------------

Notes

 

Note:                         

Notes:

Initially patient appears pleasant and her thoughts appear linear and goal directed.  Over the 

course of this conversation it became clear that patient's thogughts are not based in reality as 

she insisted her mother is not her legal guardian, she refuses to see her mother in person and will 


only communicate with her in small doses by phone.  Patient brought out a flow chart to show CC in 

which it delineates her treatment over the past 4 years and also she spoke of a number of instances 


where her parents were dismissed from tx appointments and/or violated patient's HIPPA 

rights.  Patient was also seeking assistance from this CC in helping her attain insurance through 

[employer] and CC gave patient the phone number only to find out after doing this that 

patient is on phone restrictions.

 

Date Signed:  2018 01:02 PM

Electronically Signed By:Mayte Lopez

 

 

----------------------------------------------

Behavioral Health Discharge Planning Note

----------------------------------------------

Notes

 

Note:                         

Notes:

Patient was seen after attending group and she continues to present as if she wants to look good 

and displays no insight.



CC had extensive conversation with patient's mother Tawanda.  She seemed anxious and agitated on the 

phone with a variety of questions and concerns about patient's discharge planning including whether 


patient will be able to go to [facility] and if not what will be an alternate plan.  Patient's 

mother spoke of issues related to patient's insurance, the cost of [facility] and patient's 

short-term certification and what happens with this when she is discharged from the hospital.  She 

also expressed concern about MD appointments scheduled for  related to pituitary tumor and 


whether/how to get patient to these appointments.  



Patient's mother would like to meet with LAZARA and EAN Schmid on Monday,  at 1pm.  From 

11:30-1pm, patient's mother has arranged a family session to meet with patient and in attendance 

will be patient's parents and her community therapist, Ace Whatley.  Patient's mother was concerned 


that this would not be allowed due to it be ing scheduled just prior to visiting hours.  Patient's 

mother was reassured that accommodations can be made for appointments such as these and [facility] asked 

for phone call from  on Monday morning to confirm this would be OK.  Patient's MOC was also 

advised to reach out to  on Monday morning re: this family meeting. 







 

 

Date Signed:  2018 02:04 PM

Electronically Signed By:Mayte Lopez

 

 

----------------------------------------------

Behavioral Health Discharge Planning Note

----------------------------------------------

Notes

 

Note:                         

Notes:

CC speaks to client briefly during check-in.  Client presents better than previous days.  Client 

presents as clean, slightly more organized, normal rate, tone of speech, etc.   Additionally  

completed family meeting with parents and provider yesterday regarding client's next treatment 

options.  Everyone, including client is excited for her to transfer to [facility] this , etc.  Client can be labile on the unit at times.***

 

Date Signed:  2018 02:05 PM

Electronically Signed By:Kj Bone

 

 

----------------------------------------------

Behavioral Health Discharge Planning Note

----------------------------------------------

Notes

 

Note:                         

Notes:

CC speaks to client briefly during check-in.  Client presents as brighter, more focused than 

previous days.  Client is willing to transition to [facility] tomorrow and is scheduled to be 

picked up at 9:30 am per [place] team.** Client is happier and brighter regarding her future.**







Follow up with:





Maddi Peterson/[facility]

[address] [facility] [address]

[phone]



Intake Appt:    (18) at 9:30am***

 

Date Signed:  2018 10:38 AM

Electronically Signed By:Kj Bone

 

 

----------------------------------------------

Intervention Information

----------------------------------------------
No